# Patient Record
Sex: MALE | Race: OTHER | HISPANIC OR LATINO | Employment: FULL TIME | URBAN - METROPOLITAN AREA
[De-identification: names, ages, dates, MRNs, and addresses within clinical notes are randomized per-mention and may not be internally consistent; named-entity substitution may affect disease eponyms.]

---
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
## 2021-01-25 ENCOUNTER — OFFICE VISIT (OUTPATIENT)
Dept: FAMILY MEDICINE CLINIC | Facility: CLINIC | Age: 21
End: 2021-01-25
Payer: MEDICAID

## 2021-01-25 VITALS
DIASTOLIC BLOOD PRESSURE: 70 MMHG | HEART RATE: 65 BPM | OXYGEN SATURATION: 99 % | SYSTOLIC BLOOD PRESSURE: 106 MMHG | HEIGHT: 72 IN | RESPIRATION RATE: 14 BRPM | WEIGHT: 181.5 LBS | TEMPERATURE: 97.8 F | BODY MASS INDEX: 24.58 KG/M2

## 2021-01-25 DIAGNOSIS — J35.8 TONSIL STONE: Primary | ICD-10-CM

## 2021-01-25 PROCEDURE — 99213 OFFICE O/P EST LOW 20 MIN: CPT | Performed by: FAMILY MEDICINE

## 2021-01-25 PROCEDURE — 3008F BODY MASS INDEX DOCD: CPT | Performed by: FAMILY MEDICINE

## 2021-01-25 RX ORDER — FAMOTIDINE 10 MG
10 TABLET ORAL 2 TIMES DAILY
Qty: 14 TABLET | Refills: 0 | Status: SHIPPED | OUTPATIENT
Start: 2021-01-25 | End: 2022-04-29

## 2021-01-25 NOTE — PROGRESS NOTES
Assessment/Plan:      Diagnoses and all orders for this visit:    Tonsil stone  -     famotidine (PEPCID) 10 mg tablet; Take 1 tablet (10 mg total) by mouth 2 (two) times a day        Will follow up in 1 week for well visit to assess improvement in symptoms and for well physicial      Subjective:     Patient ID: Zuhair Shane is a 24 y o  male  Complaining of a one and a half week history of tonsil stones  First one was a week and a half ago, small (about 1 cm) and white in color  Over the past week, patient has been getting more and more of these stones  These episodes were preceded by clear rhinorrhea  Not painful  Denies trouble swallowing  Feels like there's a finger down his throat  This has never happened before  Denies fevers, chills, nausea, vomiting, diarrhea, SOB, or headaches  Review of Systems   All other systems reviewed and are negative  Objective:     Physical Exam  Constitutional:       Appearance: Normal appearance  HENT:      Mouth/Throat:      Mouth: Mucous membranes are moist       Pharynx: Oropharynx is clear  Posterior oropharyngeal erythema present  Eyes:      Conjunctiva/sclera: Conjunctivae normal       Pupils: Pupils are equal, round, and reactive to light  Neck:      Musculoskeletal: Normal range of motion and neck supple  No neck rigidity or muscular tenderness  Cardiovascular:      Rate and Rhythm: Normal rate and regular rhythm  Pulmonary:      Effort: Pulmonary effort is normal       Breath sounds: Normal breath sounds  Lymphadenopathy:      Cervical: No cervical adenopathy  Skin:     General: Skin is warm and dry  Neurological:      Mental Status: He is alert and oriented to person, place, and time  Psychiatric:         Mood and Affect: Mood normal          Thought Content:  Thought content normal

## 2021-03-18 ENCOUNTER — TELEMEDICINE (OUTPATIENT)
Dept: FAMILY MEDICINE CLINIC | Facility: CLINIC | Age: 21
End: 2021-03-18
Payer: MEDICAID

## 2021-03-18 DIAGNOSIS — F41.8 ANXIETY WITH DEPRESSION: Primary | ICD-10-CM

## 2021-03-18 PROCEDURE — 99213 OFFICE O/P EST LOW 20 MIN: CPT | Performed by: FAMILY MEDICINE
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
## 2021-03-20 ENCOUNTER — LAB (OUTPATIENT)
Dept: LAB | Facility: HOSPITAL | Age: 21
End: 2021-03-20
Payer: MEDICAID

## 2021-03-20 ENCOUNTER — TRANSCRIBE ORDERS (OUTPATIENT)
Dept: ADMINISTRATIVE | Facility: HOSPITAL | Age: 21
End: 2021-03-20

## 2021-03-20 DIAGNOSIS — F41.8 ANXIETY WITH DEPRESSION: ICD-10-CM

## 2021-03-20 LAB — TSH SERPL DL<=0.05 MIU/L-ACNC: 1.24 UIU/ML (ref 0.46–3.98)

## 2021-03-20 PROCEDURE — 36415 COLL VENOUS BLD VENIPUNCTURE: CPT

## 2021-03-20 PROCEDURE — 84443 ASSAY THYROID STIM HORMONE: CPT

## 2021-04-16 ENCOUNTER — OFFICE VISIT (OUTPATIENT)
Dept: URGENT CARE | Facility: CLINIC | Age: 21
End: 2021-04-16
Payer: MEDICAID

## 2021-04-16 VITALS
BODY MASS INDEX: 25.81 KG/M2 | HEIGHT: 72 IN | RESPIRATION RATE: 18 BRPM | DIASTOLIC BLOOD PRESSURE: 59 MMHG | TEMPERATURE: 98.1 F | OXYGEN SATURATION: 98 % | SYSTOLIC BLOOD PRESSURE: 118 MMHG | WEIGHT: 190.6 LBS | HEART RATE: 67 BPM

## 2021-04-16 DIAGNOSIS — H69.82 ACUTE DYSFUNCTION OF LEFT EUSTACHIAN TUBE: ICD-10-CM

## 2021-04-16 DIAGNOSIS — J06.9 ACUTE URI: Primary | ICD-10-CM

## 2021-04-16 PROCEDURE — 3008F BODY MASS INDEX DOCD: CPT | Performed by: FAMILY MEDICINE

## 2021-04-16 PROCEDURE — 99213 OFFICE O/P EST LOW 20 MIN: CPT | Performed by: FAMILY MEDICINE

## 2021-04-16 NOTE — LETTER
April 16, 2021     Patient: Peña Hoskins   YOB: 2000   Date of Visit: 4/16/2021       To Whom It May Concern:    Peña Hoskins was evaluated in my office on 04/16/2021  Please excuse his absence  Was tested for COVID-19 (4/12/2021) and found to be NEGATIVE  May return to work on 04/17/2021  If you have any questions or concerns, please don't hesitate to call           Sincerely,        Liz Ramos MD    CC: No Recipients

## 2021-04-16 NOTE — PROGRESS NOTES
[facility]        NAME: Vickie Pagan is a 22 y o  male  : 2000    MRN: [id number]  DATE: 2021  TIME: 11:46 AM    Assessment and Plan   Acute URI [J06 9]  1  Acute URI     2  Acute dysfunction of left eustachian tube  Ambulatory Referral to Otolaryngology     Bacterial sinusitis, strep pharyngitis and pneumonia unlikely per clinical evaluation  Likely secondary to postnasal drip from rhinitis  Also appears to have chronic dysfunction of the left eustachian tube  Referred to ENT for further evaluation management  Patient advised on supportive therapy including remaining well hydrated, gargling warm salt water using Flonase  May f/u with PCP in a few days if Sxs worsen  Patient Instructions     Follow up with PCP in 3-5 days  Proceed to  ER if symptoms worsen  Chief Complaint     Chief Complaint   Patient presents with    Shortness of Breath       nasal congestion, SOB, headache, sinus pressure  Had negative Covid test at [facility]         History of Present Illness       61-year-old male presents today with about 8 days of URI symptoms including nasal congestion, coughing and sinus pressure  At the onset he recalls experiencing a fever with chest tightness prompting him to be tested for COVID-19  PCR test performed at [facility] came back negative  Today's 1st day that his symptoms are much improved reporting that he can breathe through his nose  However heave describes having central chest pain with deep respirations  Denies any current fevers, coughing or dizziness  Denies any obvious sick contacts prior to the onset of symptoms  Review of Systems   Review of Systems   Constitutional: Positive for fever  HENT: Positive for congestion, rhinorrhea and sinus pressure  Respiratory: Positive for chest tightness  Negative for cough and shortness of breath  Cardiovascular: Positive for chest pain  Gastrointestinal: Positive for nausea  Negative for abdominal pain  Musculoskeletal: Negative for myalgias  Allergic/Immunologic: Negative for environmental allergies  Neurological: Positive for dizziness and headaches  Current Medications       Current Outpatient Medications:     famotidine (PEPCID) 10 mg tablet, Take 1 tablet (10 mg total) by mouth 2 (two) times a day (Patient not taking: Reported on 4/16/2021), Disp: 14 tablet, Rfl: 0    Current Allergies     Allergies as of 04/16/2021    (No Known Allergies)            The following portions of the patient's history were reviewed and updated as appropriate: allergies, current medications, past family history, past medical history, past social history, past surgical history and problem list      Past Medical History:   Diagnosis Date    Asthma     childhood    Constipation        Past Surgical History:   Procedure Laterality Date    FRACTURE SURGERY Left     tibia    ORIF TIBIA FRACTURE Left 3/23/2017    Procedure: OPEN REDUCTION W/ INTERNAL FIXATION (ORIF) DISTAL TIBIA  ARTICULAR  FRACTURE;  Surgeon: Erick Garcia MD;  Location: [facility] MAIN OR;  Service:        Family History   Problem Relation Age of Onset    No Known Problems Mother     No Known Problems Father     No Known Problems Sister          Medications have been verified  Objective   /59   Pulse 67   Temp 98 1 °F (36 7 °C)   Resp 18   Ht 6' (1 829 m)   Wt 86 5 kg (190 lb 9 6 oz)   SpO2 98%   BMI 25 85 kg/m²   No LMP for male patient  Physical Exam     Physical Exam  Vitals signs and nursing note reviewed  Constitutional:       General: He is not in acute distress  Appearance: He is well-developed and normal weight  He is not ill-appearing, toxic-appearing or diaphoretic  HENT:      Head: Normocephalic and atraumatic  Comments: TMs gray bilaterally  Mouth/Throat:      Mouth: Mucous membranes are moist       Pharynx: No oropharyngeal exudate     Cardiovascular:      Rate and Rhythm: Normal rate and regular rhythm  Pulmonary:      Effort: Pulmonary effort is normal  No respiratory distress  Breath sounds: Normal breath sounds  No decreased breath sounds, wheezing, rhonchi or rales  Chest:      Chest wall: Tenderness (Minimal sternal tenderness) present  Skin:     General: Skin is warm  Findings: No erythema  Neurological:      General: No focal deficit present  Mental Status: He is alert and oriented to person, place, and time  Psychiatric:         Mood and Affect: Mood normal  Mood is not anxious  Behavior: Behavior normal  Behavior is not agitated

## 2021-05-31 ENCOUNTER — APPOINTMENT (EMERGENCY)
Dept: RADIOLOGY | Facility: HOSPITAL | Age: 21
End: 2021-05-31
Payer: MEDICAID

## 2021-05-31 ENCOUNTER — HOSPITAL ENCOUNTER (EMERGENCY)
Facility: HOSPITAL | Age: 21
Discharge: HOME | End: 2021-05-31
Attending: EMERGENCY MEDICINE
Payer: MEDICAID

## 2021-05-31 VITALS
TEMPERATURE: 98 F | BODY MASS INDEX: 24.41 KG/M2 | RESPIRATION RATE: 20 BRPM | SYSTOLIC BLOOD PRESSURE: 125 MMHG | DIASTOLIC BLOOD PRESSURE: 87 MMHG | HEART RATE: 76 BPM | WEIGHT: 180 LBS | OXYGEN SATURATION: 99 %

## 2021-05-31 DIAGNOSIS — R05.9 COUGH: Primary | ICD-10-CM

## 2021-05-31 PROCEDURE — 94640 AIRWAY INHALATION TREATMENT: CPT

## 2021-05-31 PROCEDURE — 99284 EMERGENCY DEPT VISIT MOD MDM: CPT

## 2021-05-31 PROCEDURE — 99284 EMERGENCY DEPT VISIT MOD MDM: CPT | Performed by: EMERGENCY MEDICINE

## 2021-05-31 PROCEDURE — 71045 X-RAY EXAM CHEST 1 VIEW: CPT

## 2021-05-31 RX ORDER — ALBUTEROL SULFATE 90 UG/1
2 AEROSOL, METERED RESPIRATORY (INHALATION) EVERY 4 HOURS PRN
Qty: 8 G | Refills: 0 | Status: SHIPPED | OUTPATIENT
Start: 2021-05-31

## 2021-05-31 RX ORDER — ALBUTEROL SULFATE 2.5 MG/3ML
2.5 SOLUTION RESPIRATORY (INHALATION) ONCE
Status: COMPLETED | OUTPATIENT
Start: 2021-05-31 | End: 2021-05-31

## 2021-05-31 RX ADMIN — ALBUTEROL SULFATE 2.5 MG: 2.5 SOLUTION RESPIRATORY (INHALATION) at 19:29

## 2021-05-31 NOTE — ED PROVIDER NOTES
History  Chief Complaint   Patient presents with    Shortness of Breath     on saturday bought a CBD vape and has been SOB and diaphoretic since     Patient states he bought a new CBD products which he had never used before and vaped it 2 days prior to arrival   Patient states he knew something was wrong immediately  He had been coughing including coughing up some particular matter since  There has been no hemoptysis  No fever  Patient feels short of breath while at rest with SaO2 of 100% on room air          Prior to Admission Medications   Prescriptions Last Dose Informant Patient Reported? Taking?   famotidine (PEPCID) 10 mg tablet Not Taking at Unknown time  No No   Sig: Take 1 tablet (10 mg total) by mouth 2 (two) times a day   Patient not taking: Reported on 4/16/2021      Facility-Administered Medications: None       Past Medical History:   Diagnosis Date    Asthma     childhood    Constipation        Past Surgical History:   Procedure Laterality Date    FRACTURE SURGERY Left     tibia    ORIF TIBIA FRACTURE Left 3/23/2017    Procedure: OPEN REDUCTION W/ INTERNAL FIXATION (ORIF) DISTAL TIBIA  ARTICULAR  FRACTURE;  Surgeon: Lily Mendez MD;  Location: [place] [address] MAIN OR;  Service:        Family History   Problem Relation Age of Onset    No Known Problems Mother     No Known Problems Father     No Known Problems Sister      I have reviewed and agree with the history as documented  E-Cigarette/Vaping    E-Cigarette Use Never User      E-Cigarette/Vaping Substances    Nicotine No     THC No     CBD No     Flavoring No     Other No     Unknown No      Social History     Tobacco Use    Smoking status: Former Smoker     Types: E-Cigarettes    Smokeless tobacco: Never Used    Tobacco comment: vape - 20 x day   Substance Use Topics    Alcohol use: No    Drug use: Yes     Comment: CBD vape       Review of Systems   Constitutional: Negative for chills and fever     HENT: Negative for congestion, sinus pressure, sneezing and sore throat  Eyes: Negative for visual disturbance  Respiratory: Positive for cough and shortness of breath  Negative for wheezing  Cardiovascular: Positive for chest pain  Negative for palpitations and leg swelling  Gastrointestinal: Negative for abdominal pain and vomiting  Genitourinary: Negative for dysuria  Musculoskeletal: Negative for back pain  Skin: Negative for rash  Neurological: Negative for weakness and headaches  Hematological: Does not bruise/bleed easily  Psychiatric/Behavioral: Negative for confusion  The patient is nervous/anxious  All other systems reviewed and are negative  Physical Exam  Physical Exam  Vitals signs and nursing note reviewed  Constitutional:       Appearance: He is well-developed  HENT:      Head: Normocephalic and atraumatic  Mouth/Throat:      Mouth: Mucous membranes are moist    Eyes:      Extraocular Movements: Extraocular movements intact  Pupils: Pupils are equal, round, and reactive to light  Neck:      Musculoskeletal: Normal range of motion and neck supple  Cardiovascular:      Rate and Rhythm: Normal rate and regular rhythm  Pulmonary:      Effort: Pulmonary effort is normal  No respiratory distress  Breath sounds: Normal breath sounds  No stridor  No wheezing or rhonchi  Chest:      Chest wall: No tenderness  Abdominal:      Tenderness: There is no abdominal tenderness  Musculoskeletal: Normal range of motion  Right lower leg: He exhibits no tenderness  Left lower leg: He exhibits no tenderness  Skin:     General: Skin is warm and dry  Neurological:      General: No focal deficit present  Mental Status: He is alert and oriented to person, place, and time  Psychiatric:         Mood and Affect: Mood is anxious           Behavior: Behavior normal          Vital Signs  ED Triage Vitals [05/31/21 1746]   Temperature Pulse Respirations Blood Pressure SpO2 98 °F (36 7 °C) 76 20 125/87 99 %      Temp src Heart Rate Source Patient Position - Orthostatic VS BP Location FiO2 (%)   -- -- -- -- --      Pain Score       5           Vitals:    05/31/21 1746   BP: 125/87   Pulse: 76         Visual Acuity      ED Medications  Medications   albuterol inhalation solution 2 5 mg (2 5 mg Nebulization Given 5/31/21 1929)       Diagnostic Studies  Results Reviewed     None                 XR chest 1 view portable   Final Result by Reid Torres MD (06/01 9402)      No acute cardiopulmonary disease  Workstation performed: [id number]                    Procedures  Procedures         ED Course                                           MDM  Number of Diagnoses or Management Options  Cough:   Diagnosis management comments: Patient has been vaping different substances for quite some time  Will check chest x-ray  Given the patient albuterol to use as in expectorant  Cautioned him against continued vaping and inhalation injuries      Disposition  Final diagnoses:   Cough     Time reflects when diagnosis was documented in both MDM as applicable and the Disposition within this note     Time User Action Codes Description Comment    5/31/2021  7:27 PM Osorio Sommers Add [R05] Cough       ED Disposition     ED Disposition Condition Date/Time Comment    Discharge Stable Mon May 31, 2021  7:26 PM Abelardo Waite discharge to home/self care              Follow-up Information     Follow up With Specialties Details Why [address], MD Pediatrics   [place] [address]  [phone]            Discharge Medication List as of 5/31/2021  7:28 PM      START taking these medications    Details   albuterol (PROVENTIL HFA,VENTOLIN HFA) 90 mcg/act inhaler Inhale 2 puffs every 4 (four) hours as needed for wheezing, Starting Mon 5/31/2021, Normal         CONTINUE these medications which have NOT CHANGED    Details   famotidine (PEPCID) 10 mg tablet Take 1 tablet (10 mg total) by mouth 2 (two) times a day, Starting Mon 1/25/2021, Normal           No discharge procedures on file      PDMP Review     None          ED Provider  Electronically Signed by           Britt Morris MD  06/01/21 9785

## 2021-08-03 ENCOUNTER — OFFICE VISIT (OUTPATIENT)
Dept: URGENT CARE | Facility: CLINIC | Age: 21
End: 2021-08-03
Payer: MEDICAID

## 2021-08-03 VITALS
BODY MASS INDEX: 24.41 KG/M2 | SYSTOLIC BLOOD PRESSURE: 133 MMHG | HEART RATE: 72 BPM | OXYGEN SATURATION: 97 % | WEIGHT: 180 LBS | DIASTOLIC BLOOD PRESSURE: 81 MMHG | TEMPERATURE: 97.9 F | RESPIRATION RATE: 18 BRPM

## 2021-08-03 DIAGNOSIS — K52.9 GASTROENTERITIS, INFECTIOUS, PRESUMED: Primary | ICD-10-CM

## 2021-08-03 PROCEDURE — 99213 OFFICE O/P EST LOW 20 MIN: CPT | Performed by: PHYSICIAN ASSISTANT

## 2021-08-03 NOTE — LETTER
August 3, 2021     Patient: Imelda Cason   YOB: 2000   Date of Visit: 8/3/2021       To Whom it May Concern:    Imelda Cason was seen in my clinic on 8/3/2021  Please excuse from work on 8/3/2021  If you have any questions or concerns, please don't hesitate to call           Sincerely,          Rajani Gonzalez PA-C        CC: No Recipients

## 2021-08-03 NOTE — PATIENT INSTRUCTIONS
Presumed infectious gastroenteritis  Bowel rest  Fluids  When trying solids, small portions of bland foods  Discussed BRAT diet  Follow up with PCP in 3-5 days  Proceed to  ER if symptoms worsen  Gastroenteritis   WHAT YOU NEED TO KNOW:   Gastroenteritis, or stomach flu, is an infection of the stomach and intestines  DISCHARGE INSTRUCTIONS:   Call 911 for any of the following:   · You have trouble breathing or a very fast pulse  Return to the emergency department if:   · You see blood in your diarrhea  · You cannot stop vomiting  · You have not urinated for 12 hours  · You feel like you are going to faint  Contact your healthcare provider if:   · You have a fever  · You continue to vomit or have diarrhea, even after treatment  · You see worms in your diarrhea  · Your mouth or eyes are dry  You are not urinating as much or as often  · You have questions or concerns about your condition or care  Medicines:   · Medicines  may be given to stop vomiting or diarrhea, decrease abdominal cramps, or treat an infection  · Take your medicine as directed  Contact your healthcare provider if you think your medicine is not helping or if you have side effects  Tell him or her if you are allergic to any medicine  Keep a list of the medicines, vitamins, and herbs you take  Include the amounts, and when and why you take them  Bring the list or the pill bottles to follow-up visits  Carry your medicine list with you in case of an emergency  Manage your symptoms:   · Drink liquids as directed  Ask your healthcare provider how much liquid to drink each day, and which liquids are best for you  You may also need to drink an oral rehydration solution (ORS)  An ORS has the right amounts of sugar, salt, and minerals in water to replace body fluids  · Eat bland foods  When you feel hungry, begin eating soft, bland foods  Examples are bananas, clear soup, potatoes, and applesauce   Do not have dairy products, alcohol, sugary drinks, or drinks with caffeine until you feel better  · Rest as much as possible  Slowly start to do more each day when you begin to feel better  Prevent the spread of gastroenteritis:  Gastroenteritis can spread easily  Keep yourself, your family, and your surroundings clean to help prevent the spread of gastroenteritis:  · Wash your hands often  Use soap and water  Wash your hands after you use the bathroom, change a child's diapers, or sneeze  Wash your hands before you prepare or eat food  · Clean surfaces and do laundry often  Wash your clothes and towels separately from the rest of the laundry  Clean surfaces in your home with antibacterial  or bleach  · Clean food thoroughly and cook safely  Wash raw vegetables before you cook  Cook meat, fish, and eggs fully  Do not use the same dishes for raw meat as you do for other foods  Refrigerate any leftover food immediately  · Be aware when you camp or travel  Drink only clean water  Do not drink from rivers or lakes unless you purify or boil the water first  When you travel, drink bottled water and do not add ice  Do not eat fruit that has not been peeled  Do not eat raw fish or meat that is not fully cooked  Follow up with your healthcare provider as directed:  Write down your questions so you remember to ask them during your visits  © Copyright [employer] 2021 Information is for End User's use only and may not be sold, redistributed or otherwise used for commercial purposes  All illustrations and images included in CareNotes® are the copyrighted property of A D A M , Inc  or [facility] Pastr[facility]   The above information is an  only  It is not intended as medical advice for individual conditions or treatments  Talk to your doctor, nurse or pharmacist before following any medical regimen to see if it is safe and effective for you

## 2021-08-03 NOTE — PROGRESS NOTES
[address] [facility]    NAME: Alicja Walton is a 22 y o  male  : 2000    MRN: [id number]  DATE: 2021  TIME: 10:59 AM    Assessment and Plan   Gastroenteritis, infectious, presumed [K52 9]  1  Gastroenteritis, infectious, presumed       Patient Instructions   Presumed infectious gastroenteritis  Bowel rest  Fluids  When trying solids, small portions of bland foods  Discussed BRAT diet  Follow up with PCP in 3-5 days  Proceed to  ER if symptoms worsen  Chief Complaint     Chief Complaint   Patient presents with    Vomiting     went to the fair on saturday and after eating had severe abdominal cramping  later he vomited  diarrhea and vomitng continue  r         History of Present Illness        Florinda Galloway is a 64-year-old male who presents to clinic complaining of vomiting and diarrhea since last night  He states that he went to the [facility] last night and 1-1/2 hour after eating started with vomiting  He states this morning started with diarrhea  He is also complaining of fatigue, heartburn, and headache  He has only been tolerating fluids no solids  He denies any fever chills  He denies any blood or mucus in the diarrhea vomit  He denies any abdominal pain  Review of Systems   Review of Systems   Constitutional: Positive for fatigue  Negative for chills and fever  Gastrointestinal: Positive for diarrhea and vomiting  Negative for abdominal pain and blood in stool  Neurological: Positive for headaches           Current Medications       Current Outpatient Medications:     albuterol (PROVENTIL HFA,VENTOLIN HFA) 90 mcg/act inhaler, Inhale 2 puffs every 4 (four) hours as needed for wheezing (Patient not taking: Reported on 2021), Disp: 8 g, Rfl: 0    famotidine (PEPCID) 10 mg tablet, Take 1 tablet (10 mg total) by mouth 2 (two) times a day (Patient not taking: Reported on 2021), Disp: 14 tablet, Rfl: 0    Current Allergies     Allergies as of 2021    (No Known Allergies) The following portions of the patient's history were reviewed and updated as appropriate: allergies, current medications, past family history, past medical history, past social history, past surgical history and problem list      Past Medical History:   Diagnosis Date    Asthma     childhood    Constipation        Past Surgical History:   Procedure Laterality Date    FRACTURE SURGERY Left     tibia    ORIF TIBIA FRACTURE Left 3/23/2017    Procedure: OPEN REDUCTION W/ INTERNAL FIXATION (ORIF) DISTAL TIBIA  ARTICULAR  FRACTURE;  Surgeon: Aileen Cano MD;  Location: [place] [address] MAIN OR;  Service:        Family History   Problem Relation Age of Onset    No Known Problems Mother     No Known Problems Father     No Known Problems Sister          Medications have been verified  Objective   /81   Pulse 72   Temp 97 9 °F (36 6 °C)   Resp 18   Wt 81 6 kg (180 lb)   SpO2 97%   BMI 24 41 kg/m²   No LMP for male patient  Physical Exam     Physical Exam  Vitals and nursing note reviewed  Constitutional:       General: He is not in acute distress  Appearance: Normal appearance  He is not ill-appearing  Cardiovascular:      Rate and Rhythm: Normal rate and regular rhythm  Heart sounds: Normal heart sounds  Pulmonary:      Effort: Pulmonary effort is normal       Breath sounds: Normal breath sounds  Abdominal:      General: Abdomen is flat  Bowel sounds are increased  Palpations: Abdomen is soft  Tenderness: There is no abdominal tenderness  There is no guarding or rebound  Neurological:      Mental Status: He is alert and oriented to person, place, and time     Psychiatric:         Mood and Affect: Mood normal          Behavior: Behavior normal

## 2021-08-04 ENCOUNTER — OFFICE VISIT (OUTPATIENT)
Dept: FAMILY MEDICINE CLINIC | Facility: CLINIC | Age: 21
End: 2021-08-04
Payer: MEDICAID

## 2021-08-04 VITALS
DIASTOLIC BLOOD PRESSURE: 80 MMHG | HEART RATE: 115 BPM | RESPIRATION RATE: 16 BRPM | BODY MASS INDEX: 24.52 KG/M2 | HEIGHT: 72 IN | OXYGEN SATURATION: 98 % | SYSTOLIC BLOOD PRESSURE: 102 MMHG | TEMPERATURE: 97.8 F | WEIGHT: 181 LBS

## 2021-08-04 DIAGNOSIS — K52.9 GASTROENTERITIS: Primary | ICD-10-CM

## 2021-08-04 PROCEDURE — 99212 OFFICE O/P EST SF 10 MIN: CPT | Performed by: FAMILY MEDICINE

## 2021-08-04 NOTE — LETTER
August 4, 2021     Patient: Eber Rogers   YOB: 2000   Date of Visit: 8/4/2021       To Whom it May Concern:    Eber Rogers is under my professional care  He was seen in my office on 8/4/2021  He may return to work on 8/5/2021  If you have any questions or concerns, please don't hesitate to call           Sincerely,          Dinesh Manzo MD        CC: No Recipients

## 2021-08-04 NOTE — PROGRESS NOTES
Assessment/Plan:      Diagnoses and all orders for this visit:    Gastroenteritis        Maki Leon should continue supportive care for gastroenteritis  Overall he is much improved from symptom onset and symptoms should continue to resolve over the next 2-3 days  RTO   In 4-6 weeks for annual physical     Subjective:     Patient ID: Harvinder Bazzi is a 22 y o  male   With a past medical history of intermittent asthma with infrequent albuterol use presenting today with abdominal pain, nausea, vomiting  This started  3 days ago when he [place]  He states that he final caking approximately 2-3 hours later both him and his girlfriend who also ate the food began to feel abdominal cramps for approximately 6 hours a began to have nonbloody nonbilious emesis and  Diarrhea  This occurred for about 24-48 hours  He also felt subjectively feverish  Today he is able to eat and drink close to baseline  He still having difficulty with some foods  Diarrhea and emesis overall have abated  Review of Systems   Constitutional: Positive for appetite change  Negative for chills, fatigue and fever  Gastrointestinal: Negative for abdominal distention, abdominal pain, blood in stool, constipation, diarrhea and nausea  Musculoskeletal: Negative for arthralgias and myalgias  Skin: Negative for pallor  Objective:     Physical Exam  Vitals reviewed  HENT:      Head: Normocephalic  Cardiovascular:      Rate and Rhythm: Normal rate  Abdominal:      General: Abdomen is flat  Bowel sounds are normal       Palpations: Abdomen is soft  Skin:     General: Skin is warm and dry  Neurological:      Mental Status: He is alert

## 2021-08-11 ENCOUNTER — HOSPITAL ENCOUNTER (EMERGENCY)
Facility: HOSPITAL | Age: 21
Discharge: HOME | End: 2021-08-11
Attending: EMERGENCY MEDICINE
Payer: MEDICAID

## 2021-08-11 VITALS
SYSTOLIC BLOOD PRESSURE: 115 MMHG | HEART RATE: 90 BPM | RESPIRATION RATE: 18 BRPM | OXYGEN SATURATION: 97 % | DIASTOLIC BLOOD PRESSURE: 83 MMHG | TEMPERATURE: 98.4 F

## 2021-08-11 DIAGNOSIS — H66.90 OTITIS MEDIA: Primary | ICD-10-CM

## 2021-08-11 LAB
S PYO DNA THROAT QL NAA+PROBE: NORMAL
SARS-COV-2 RNA RESP QL NAA+PROBE: NEGATIVE

## 2021-08-11 PROCEDURE — U0005 INFEC AGEN DETEC AMPLI PROBE: HCPCS | Performed by: EMERGENCY MEDICINE

## 2021-08-11 PROCEDURE — 99283 EMERGENCY DEPT VISIT LOW MDM: CPT

## 2021-08-11 PROCEDURE — 87651 STREP A DNA AMP PROBE: CPT | Performed by: EMERGENCY MEDICINE

## 2021-08-11 PROCEDURE — U0003 INFECTIOUS AGENT DETECTION BY NUCLEIC ACID (DNA OR RNA); SEVERE ACUTE RESPIRATORY SYNDROME CORONAVIRUS 2 (SARS-COV-2) (CORONAVIRUS DISEASE [COVID-19]), AMPLIFIED PROBE TECHNIQUE, MAKING USE OF HIGH THROUGHPUT TECHNOLOGIES AS DESCRIBED BY CMS-2020-01-R: HCPCS | Performed by: EMERGENCY MEDICINE

## 2021-08-11 PROCEDURE — 99284 EMERGENCY DEPT VISIT MOD MDM: CPT | Performed by: EMERGENCY MEDICINE

## 2021-08-11 RX ORDER — AMOXICILLIN AND CLAVULANATE POTASSIUM 875; 125 MG/1; MG/1
1 TABLET, FILM COATED ORAL EVERY 12 HOURS
Qty: 20 TABLET | Refills: 0 | Status: SHIPPED | OUTPATIENT
Start: 2021-08-11 | End: 2021-08-21

## 2021-08-11 NOTE — ED PROVIDER NOTES
History  Chief Complaint   Patient presents with    Fever - 9 weeks to 74 years     here last week with GI problem  started with scratchy throat several days ago  progressed to uri and fever now has cough     22year-old male presents with scratchy throat left ear pain and cough for the past few days  He has not been vaccinated against COVID  No other symptoms of nausea vomiting abdominal pain diarrhea constipation or any other symptoms  History provided by:  Patient   used: No        Prior to Admission Medications   Prescriptions Last Dose Informant Patient Reported? Taking? albuterol (PROVENTIL HFA,VENTOLIN HFA) 90 mcg/act inhaler   No No   Sig: Inhale 2 puffs every 4 (four) hours as needed for wheezing   Patient not taking: Reported on 8/4/2021   famotidine (PEPCID) 10 mg tablet   No No   Sig: Take 1 tablet (10 mg total) by mouth 2 (two) times a day   Patient not taking: Reported on 4/16/2021      Facility-Administered Medications: None       Past Medical History:   Diagnosis Date    Asthma     childhood    Constipation        Past Surgical History:   Procedure Laterality Date    FRACTURE SURGERY Left     tibia    ORIF TIBIA FRACTURE Left 3/23/2017    Procedure: OPEN REDUCTION W/ INTERNAL FIXATION (ORIF) DISTAL TIBIA  ARTICULAR  FRACTURE;  Surgeon: Bailey New MD;  Location: [facility] MAIN OR;  Service:        Family History   Problem Relation Age of Onset    No Known Problems Mother     No Known Problems Father     No Known Problems Sister      I have reviewed and agree with the history as documented      E-Cigarette/Vaping    E-Cigarette Use Never User      E-Cigarette/Vaping Substances    Nicotine No     THC No     CBD No     Flavoring No     Other No     Unknown No      Social History     Tobacco Use    Smoking status: Former Smoker     Types: E-Cigarettes    Smokeless tobacco: Never Used    Tobacco comment: vape - 20 x day   Vaping Use    Vaping Use: Never used   Substance Use Topics    Alcohol use: No    Drug use: Yes     Comment: CBD vape       Review of Systems   Constitutional: Negative  HENT: Positive for congestion, ear pain and sore throat  Eyes: Negative  Respiratory: Negative  Cardiovascular: Negative  Gastrointestinal: Negative  Endocrine: Negative  Genitourinary: Negative  Musculoskeletal: Negative  Skin: Negative  Allergic/Immunologic: Negative  Neurological: Negative  Hematological: Negative  Psychiatric/Behavioral: Negative  All other systems reviewed and are negative  Physical Exam  Physical Exam  Constitutional:       Appearance: Normal appearance  HENT:      Head: Normocephalic and atraumatic  Comments: Left ear:  Erythema noted in the ear canal with bulging of the tympanic membrane  No evidence of mastoiditis  Right ear:  Normal exam      Nose: Congestion present  Mouth/Throat:      Mouth: Mucous membranes are moist       Pharynx: Posterior oropharyngeal erythema present  Eyes:      Extraocular Movements: Extraocular movements intact  Pupils: Pupils are equal, round, and reactive to light  Cardiovascular:      Rate and Rhythm: Normal rate and regular rhythm  Pulmonary:      Effort: Pulmonary effort is normal       Breath sounds: Normal breath sounds  Abdominal:      General: Abdomen is flat  Bowel sounds are normal       Palpations: Abdomen is soft  Musculoskeletal:         General: Normal range of motion  Cervical back: Normal range of motion and neck supple  Skin:     General: Skin is warm  Capillary Refill: Capillary refill takes less than 2 seconds  Neurological:      General: No focal deficit present  Mental Status: He is alert and oriented to person, place, and time  Mental status is at baseline  Psychiatric:         Mood and Affect: Mood normal          Thought Content:  Thought content normal          Vital Signs  ED Triage Vitals [08/11/21 1448]   Temperature Pulse Respirations Blood Pressure SpO2   98 4 °F (36 9 °C) 96 20 115/83 97 %      Temp Source Heart Rate Source Patient Position - Orthostatic VS BP Location FiO2 (%)   Tympanic Monitor Sitting Right arm --      Pain Score       7           Vitals:    08/11/21 1448   BP: 115/83   Pulse: 96   Patient Position - Orthostatic VS: Sitting         Visual Acuity      ED Medications  Medications - No data to display    Diagnostic Studies  Results Reviewed     Procedure Component Value Units Date/Time    Strep A PCR [[id number]]  (Normal) Collected: 08/11/21 1551    Lab Status: Final result Specimen: Throat Updated: 08/11/21 1642     STREP A PCR None Detected    Novel Coronavirus [facility]-),PCR [facility] [[id number]] Collected: 08/11/21 1551    Lab Status: In process Specimen: Nares from Nose Updated: 08/11/21 1602                 No orders to display              Procedures  Procedures         ED Course                                           MDM  Number of Diagnoses or Management Options     Amount and/or Complexity of Data Reviewed  Clinical lab tests: ordered and reviewed  Tests in the medicine section of CPT®: ordered and reviewed    Patient Progress  Patient progress: stable      Disposition  Final diagnoses:   Otitis media     Time reflects when diagnosis was documented in both MDM as applicable and the Disposition within this note     Time User Action Codes Description Comment    8/11/2021  4:50 PM Erwin Estrada Add [H66 90] Otitis media       ED Disposition     ED Disposition Condition Date/Time Comment    Discharge Stable Wed Aug 11, 2021  4:50 PM oRwena Navarro discharge to home/self care              Follow-up Information     Follow up With Specialties Details Why Contact Info Additional Information    [facility] Emergency Department Emergency Medicine  If symptoms worsen [address] [address]  [address] [place] [address] Emergency Department, [address] [place] [address], [address]          Patient's Medications   Discharge Prescriptions    AMOXICILLIN-CLAVULANATE (AUGMENTIN) 875-125 MG PER TABLET    Take 1 tablet by mouth every 12 (twelve) hours for 10 days       Start Date: 8/11/2021 End Date: 8/21/2021       Order Dose: 1 tablet       Quantity: 20 tablet    Refills: 0     No discharge procedures on file      PDMP Review     None          ED Provider  Electronically Signed by           Janet Marshall DO  08/11/21 5756

## 2021-08-11 NOTE — Clinical Note
Shellie Delarosa was seen and treated in our emergency department on 8/11/2021  Diagnosis:     [place]    He may return on this date:     Pt was seen in The ER on 8/11/21  If you have any questions or concerns, please don't hesitate to call        Nessa Gastelum RN    ______________________________           _______________          _______________  Hospital Representative                              Date                                Time

## 2021-08-26 ENCOUNTER — OFFICE VISIT (OUTPATIENT)
Dept: FAMILY MEDICINE CLINIC | Facility: CLINIC | Age: 21
End: 2021-08-26
Payer: MEDICAID

## 2021-08-26 VITALS
HEART RATE: 72 BPM | HEIGHT: 70 IN | OXYGEN SATURATION: 98 % | SYSTOLIC BLOOD PRESSURE: 116 MMHG | BODY MASS INDEX: 26.05 KG/M2 | DIASTOLIC BLOOD PRESSURE: 76 MMHG | RESPIRATION RATE: 18 BRPM | WEIGHT: 182 LBS | TEMPERATURE: 97.7 F

## 2021-08-26 DIAGNOSIS — Z87.81 HX OF FRACTURE OF TIBIA: ICD-10-CM

## 2021-08-26 DIAGNOSIS — M25.572 ACUTE LEFT ANKLE PAIN: Primary | ICD-10-CM

## 2021-08-26 PROCEDURE — 99213 OFFICE O/P EST LOW 20 MIN: CPT | Performed by: FAMILY MEDICINE

## 2021-08-26 NOTE — LETTER
August 26, 2021     Patient: Fredy Daly   YOB: 2000   Date of Visit: 8/26/2021       To Whom it May Concern:    Fredy Daly is under my professional care  He was seen in my office on 8/26/2021  He may return to work with limitations 8/30/21  Limit weight bearing on left ankle until patient is evaluated by specialist     If you have any questions or concerns, please don't hesitate to call           Sincerely,          Froilan Mcclendon MD

## 2021-08-26 NOTE — PROGRESS NOTES
Assessment/Plan:      Diagnoses and all orders for this visit:    Acute left ankle pain  -     Ambulatory referral to Orthopedic Surgery; Future    Hx of fracture of tibia  -     Cancel: Ambulatory referral to Orthopedic Surgery; Future  -     Ambulatory referral to Orthopedic Surgery; Future          Patient is weight-bearing in office  History of a closed fracture of distal end of left tibia in March of 2017  Repaired by Dr Jocelynn Mcclendon at Orthopedic surgery  Referral placed to follow-up with orthopedic surgery given history  Follow-up in 1 month for yearly physical     Subjective:     Patient ID: Jodean Cheadle is a 22 y o  male  Ankle Pain   The incident occurred 12 to 24 hours ago  The incident occurred at work  The pain is present in the left ankle  The pain is at a severity of 9/10 (w/ walking)  The pain has been constant since onset  Associated symptoms include numbness (medial aspect of left ankle)  Pertinent negatives include no inability to bear weight, loss of sensation or tingling  The symptoms are aggravated by movement and weight bearing  He has tried ice for the symptoms  The treatment provided no relief  Hx of closed extra-articular fracture of distal end of left tibia in March 2017  Review of Systems   Constitutional: Negative for chills and fever  HENT: Negative for sore throat  Respiratory: Negative for shortness of breath  Cardiovascular: Negative for chest pain  Gastrointestinal: Negative for abdominal pain, constipation, diarrhea, nausea and vomiting  Genitourinary: Negative for difficulty urinating and dysuria  Neurological: Positive for numbness (medial aspect of left ankle)  Negative for tingling  Objective:     Physical Exam  Constitutional:       Appearance: Normal appearance  Cardiovascular:      Rate and Rhythm: Normal rate and regular rhythm  Heart sounds: Normal heart sounds  No murmur heard       Pulmonary:      Effort: Pulmonary effort is normal       Breath sounds: Normal breath sounds  No wheezing  Abdominal:      Palpations: Abdomen is soft  Tenderness: There is no abdominal tenderness  Musculoskeletal:      Right lower leg: No edema  Left lower leg: No edema (mild)  Right ankle: Normal  No swelling, deformity or ecchymosis  Normal range of motion  Left ankle: Swelling (mild) present  No deformity or ecchymosis  Tenderness present over the lateral malleolus  Medial malleolus: numbness  Decreased range of motion  Neurological:      Mental Status: He is alert

## 2021-08-27 ENCOUNTER — OFFICE VISIT (OUTPATIENT)
Dept: OBGYN CLINIC | Facility: CLINIC | Age: 21
End: 2021-08-27
Payer: MEDICAID

## 2021-08-27 ENCOUNTER — APPOINTMENT (OUTPATIENT)
Dept: RADIOLOGY | Facility: CLINIC | Age: 21
End: 2021-08-27
Payer: MEDICAID

## 2021-08-27 VITALS
BODY MASS INDEX: 26.37 KG/M2 | SYSTOLIC BLOOD PRESSURE: 122 MMHG | HEIGHT: 70 IN | HEART RATE: 78 BPM | DIASTOLIC BLOOD PRESSURE: 69 MMHG | WEIGHT: 184.2 LBS

## 2021-08-27 DIAGNOSIS — Z87.81 HX OF FRACTURE OF TIBIA: ICD-10-CM

## 2021-08-27 DIAGNOSIS — M19.172 POST-TRAUMATIC ARTHRITIS OF LEFT ANKLE: Primary | ICD-10-CM

## 2021-08-27 DIAGNOSIS — M25.572 ACUTE LEFT ANKLE PAIN: ICD-10-CM

## 2021-08-27 PROCEDURE — 73610 X-RAY EXAM OF ANKLE: CPT

## 2021-08-27 PROCEDURE — 99203 OFFICE O/P NEW LOW 30 MIN: CPT | Performed by: ORTHOPAEDIC SURGERY

## 2021-08-27 NOTE — PROGRESS NOTES
Assessment/Plan:  1  Post-traumatic arthritis of left ankle  Ambulatory referral to Physical Therapy   2  Hx of fracture of tibia  Ambulatory referral to Orthopedic Surgery   3  Acute left ankle pain  Ambulatory referral to Orthopedic Surgery    XR ankle 3+ vw left       Scribe Attestation    I,:  Carmen Guillen MA am acting as a scribe while in the presence of the attending physician :       I,:  Ernestina Rose MD personally performed the services described in this documentation    as scribed in my presence :             I discussed with Ronaldo Zuhair that his signs and symptoms are consistent with post-traumatic left ankle arthritis  X-rays were reviewed which demonstrates mild degenerative changes with anterior osteophyte formation  Patient does have stiffness and weakness on exam  I would like to treat non operative in the form of formal physical therapy  He was agreeable to this  A referral was provided to formal therapy for range of motion and strengthening  I discussed with him he may need surgery in the future for the arthritis  He will follow up in 6 weeks for repeat evaluation  Subjective:   Jodean Cheadle is a 22 y o  male who presents to the office today for evaluation of his left ankle  He is referred by his primary care physician  I did perform ORIF left ankle for a tibia fracture on 3/23/17  Patient states he has always had mild intermittent pain since surgery however, he states this has been progressively getting worse over the past 2 months  Patient states this has increased the past 2 days  Patient notes pain to the lateral aspect of his ankle  He notes increased pain with ambulation  Patient also notes cracking with motion  He does note stiffness about the ankle  Patient states he did change his gait due to the pain  He has not been taking anything OTC for pain  Patient did have bruising 2 days ago  He denies any known injury or trauma         Review of Systems   Constitutional: Negative for chills and fever  HENT: Negative for drooling and sneezing  Eyes: Negative for redness  Respiratory: Negative for cough and wheezing  Gastrointestinal: Negative for nausea and vomiting  Musculoskeletal: Negative for arthralgias, joint swelling and myalgias  Neurological: Negative for weakness and numbness  Psychiatric/Behavioral: Negative for behavioral problems  The patient is not nervous/anxious            Past Medical History:   Diagnosis Date    Asthma     childhood    Constipation        Past Surgical History:   Procedure Laterality Date    FRACTURE SURGERY Left     tibia    ORIF TIBIA FRACTURE Left 3/23/2017    Procedure: OPEN REDUCTION W/ INTERNAL FIXATION (ORIF) DISTAL TIBIA  ARTICULAR  FRACTURE;  Surgeon: Jose Bone MD;  Location: [place] [address] MAIN OR;  Service:        Family History   Problem Relation Age of Onset    No Known Problems Mother     No Known Problems Father     No Known Problems Sister        Social History     Occupational History    Not on file   Tobacco Use    Smoking status: Former Smoker     Types: E-Cigarettes    Smokeless tobacco: Never Used    Tobacco comment: vape - 20 x day   Vaping Use    Vaping Use: Never used   Substance and Sexual Activity    Alcohol use: No    Drug use: Yes     Frequency: 1 0 times per week     Types: Marijuana    Sexual activity: Yes     Partners: Female     Birth control/protection: Condom Male         Current Outpatient Medications:     albuterol (PROVENTIL HFA,VENTOLIN HFA) 90 mcg/act inhaler, Inhale 2 puffs every 4 (four) hours as needed for wheezing (Patient not taking: Reported on 8/4/2021), Disp: 8 g, Rfl: 0    famotidine (PEPCID) 10 mg tablet, Take 1 tablet (10 mg total) by mouth 2 (two) times a day (Patient not taking: Reported on 4/16/2021), Disp: 14 tablet, Rfl: 0    No Known Allergies    Objective:  Vitals:    08/27/21 1326   BP: 122/69   Pulse: 78       Left Ankle Exam     Range of Motion   Dorsiflexion: 10 Plantar flexion: 30     Muscle Strength   Dorsiflexion:  5/5   Plantar flexion:  5/5     Other   Erythema: absent  Scars: present  Sensation: normal  Pulse: present    Comments:  4/5 eversion   5/5 inversion           Observations   Left Ankle/Foot   Positive for adhesive scar  Strength/Myotome Testing     Left Ankle/Foot   Dorsiflexion: 5  Plantar flexion: 5      Physical Exam  Constitutional:       Appearance: He is well-developed  HENT:      Head: Normocephalic and atraumatic  Eyes:      General:         Right eye: No discharge  Left eye: No discharge  Conjunctiva/sclera: Conjunctivae normal    Cardiovascular:      Rate and Rhythm: Normal rate  Pulmonary:      Effort: Pulmonary effort is normal  No respiratory distress  Musculoskeletal:      Cervical back: Normal range of motion and neck supple  Comments: As noted in HPI   Skin:     General: Skin is warm and dry  Neurological:      Mental Status: He is alert and oriented to person, place, and time  Psychiatric:         Behavior: Behavior normal          Thought Content: Thought content normal          Judgment: Judgment normal          I have personally reviewed pertinent films in PACS and my interpretation is as follows: x-ray left ankle performed in the office today demonstrates mild degenerative changes with anterior osteophyte formation  No signs of hardware failure

## 2021-09-08 ENCOUNTER — EVALUATION (OUTPATIENT)
Dept: PHYSICAL THERAPY | Facility: CLINIC | Age: 21
End: 2021-09-08
Payer: MEDICAID

## 2021-09-08 DIAGNOSIS — M19.172 POST-TRAUMATIC ARTHRITIS OF ANKLE, LEFT: Primary | ICD-10-CM

## 2021-09-08 PROCEDURE — 97161 PT EVAL LOW COMPLEX 20 MIN: CPT

## 2021-09-08 NOTE — PROGRESS NOTES
PT Evaluation     Today's date: 2021  Patient name: Shona Hickman  : 2000  MRN: [id number]  Referring provider: Efrain Ozuna*  Dx:   Encounter Diagnosis     ICD-10-CM    1  Post-traumatic arthritis of ankle, left  M19 172                   Assessment  Assessment details:   CASE SUMMARY:   Shona Hickman is a 22y o  year old male who presents to OPPT upon referral from ortho  secondary to c/o left ankle pain  On aug 26 went to primary care dr due to swelling and bruising and swelling under incision  And progressively worsening pain  Describes bruising with standing and walking for greater then 2 hours    Sera Dixon reports onset of symptoms ~  4 years ago as a result of surgical fixation of tibia fracture  Current symptom location includes on lateral aspect of mid to hind foot with weight bearing on left  and patient describes  current symptoms as: sharp with walking and achy  + swelling and bruising  Symptoms are: constant  Pain level:  Current: 5/10  At Best: 5/10   At worst: 9-10/10 and with ADL's 7/10  Symptoms improve with: rest , and worsen with walking and standing  Overall symptom progression at this time is wrosening  Previous injury to this area: fell into a pot hole and fractured left tibia by twisting it  Previous treatment includes: surgery, PT ~ 6 months  Diagnostic testing includes: xray   States he may need additional surgery in the future per ortho  PMHx includes: See chart for full details with medications, allergies, past family history, past medical history, social history, past surgical history and problem list  All topics were reviewed  Functionally, at baseline, Sera Dixon is independent with all basic ADL's and advanced ADL's  Currently, Sera Dixon is limited in the following activities: walking greater then 2 hours, pain with stairs, difficulty swimming, hiking        Sera Dixon is lives with family in a 3 story home with 5 stairs to enter and flight  stairs inside with + [employer]  Recreational activities include: none   Sirisha Silver is employed at [employer] and is currently on leave until he is cleared  Patient goal(s) for physical therapy is: to improve his ROM and hopefully lessen the pain  The following is a summary of [place] objective status:    Impairments:   Postural dysfunction  Reduced ROM: left ankle  Reduced strength  + TTP: left ankle  Reduced flexibility: left calf  Gait/elevation dysfunction  Reduced stability  Reduced activity tolerance including above stated functional limitations    Patient's clinical presentation is consistent with their referring diagnosis of: left ankle post traumatic arthritis   Patient presents with reduced ROM, gait deviations, reduced balance and flexibility in left ankle resulting in reduced funciton  Patient will benefit form PT to address these deficits and improve function  Patients mother present for evaluation    PLOC was discussed and agreed upon with patient  Patient was educated on: HEP and [address]   Patient vocalized a good understanding of  PLOC and HEP issued  Bairon Marshall would benefit from skilled physical therapy services to address their aforementioned functional limitations and progress towards prior level of function, to meet stated goals,  and independence with home exercise program   Prognosis for successful rehab outcome is good with consistent participation in therapy and carryover of HEP and PLOC  FOTO score is 32% with a 61% prediction in function       Functional limitations: per patient subjectiveUnderstanding of Dx/Px/POC: good   Prognosis: good    Goals  STG:  + Patient will have pain level 2/10 left ankle  at rest (2-3 weeks)  + Patient will report a 35% improvement in symptoms (2-3 weeks)   + Patient will be independent in basic HEP (2-3 weeks)  + Patient will demonstrate an increase in range of motion left ankle DF   to  8 AROM and 10 PROM (3 weeks)  + Patient will increase SLS time by 10  seconds (2-3 weeks)  + Patient will report increased ease walking due to a reduction in pain (2-3 weeks)    LTG:  + Patient will have pain level 3/10 left ankle  with ADL's (4-6 weeks)  + Patient will report a 60% improvement in symptoms (4-6 weeks)   + Patient will increase FOTO score by 10 points  (10 sessions)   + Patient will be independent in comprehensive HEP (4-6 weeks)  + Patient will demonstrate no gait deviations ambulating on level surfaces  (4-6 week)  + Patient will negotiate stairs reciprically with use of one railing consistently  (4-6 weeks)  + Patient will return to work due to a reduction in pain (4-6 weeks)    Plan  Plan details:  2-3 x week, 4-6 weeks: HEP development, stretching LE musculature per objective findings, strengthening LE musculature per objective findings, A/AA/PROM ankle motions, joint mobilizations prn, posture education especially foot posture prn, STM/MI as needed to reduce muscle tension as per objective findings, muscle reeducation prn, gait/balance training, stability training, Boudreaux/Marii prn PLOC discussed and agreed upon with patient  Patient would benefit from: skilled physical therapy  Frequency: 2x week  Plan of Care beginning date: 9/8/2021  Plan of Care expiration date: 11/3/2021  Treatment plan discussed with: patient        Subjective    Objective     Static Posture     Comments  Posture:reduced arches josy, calcaneal neutral josy     Gait:no assistive device, increased arm swing on left, reduced arm swing on right, reduced push off on left   Elevations: reciprical unless inc pain then step to    Functional activities  SLS: Right 40 sec    Left: 23 sec + pain    Squat: redued balance, reduced ankle DF  + pain    Palpation: + Tenderness beneath lateral malleolus    Girth (figure 8):  Right:  53 5 cm,  Left 53 2 cm    ROM  Ankle Dorsiflexion: Right: 15    Left: 0  + pain across anterior TC joint  Ankle Plantarflexion: Right: 48 Left: 48     Ankle Inversion: Right: 45  Left: 40  + pain Ankle Eversion: Right: 12  Left: 6  no pain    First MTP flexion: Right: wnl   Left: wnl     First MTP extension: Right: wnl   Left: wnl       Joint mobility:   Metatarsal: Right: wnl   Left: wnl       MMT  Ankle Dorsiflexion: Right: 4 /5  left: 4/5     Ankle Plantarflexion: Right: 4/5  Left: 4/5     Ankle Inversion: Right: 4/5   Left: 4/5     Ankle Eversion: Right: 4 /5  Left: 4/5     Bilateral heel raise: 10 x  Unilateral heel raise: Right:  10x   Left 10 x limited ROM  + pain     Flexibility  Gastroc/soleus: Right:  wnl  Left fair-             Precautions: asthma    Specialty Daily Treatment Diary     Insurance:         Visits: 1       Manual 9/8/21       PROM Ankle: all directions        IASTM w/ roll out stick calf        Joint mobs: TC joint                                Exercise Diary         PROTOCOL:         Ther ex:         Rec bike/nustep        prostretch        Calf stretch with SOS instruct       Ankle Tband 4 way        Ankle Df stretch on step        Towel scrunches        Bilateral heel raises        Kneeling PF stretch        Ankle DF stretch with AP glide using band                Neuro Poncho:        Bapps Bd:    DF/PF  INV/EV  CW/CCW        Ankle alphabets instruct       SLS        Rockerboard:  DF/PF  INV/EV                Ther Activity:        Reevaluation                Gait Training:                 HEP:                Modalities:        MH        Ice        Total time:                   Access Code: [email or web address]  URL: [email or web address]  Date: 09/08/2021  Prepared by:  Jolly Barton    Exercises  Long Sitting Calf Stretch with Strap - 2 x daily - 7 x weekly - 1 sets - 5 reps - 10 sec hold  Seated Ankle Alphabet - 2 x daily - 7 x weekly - 1 sets - 1 reps  Standing Ankle Dorsiflexion Stretch - 2 x daily - 7 x weekly - 1 sets - 10 reps - 5 sec hold

## 2021-09-14 ENCOUNTER — TELEPHONE (OUTPATIENT)
Dept: PHYSICAL THERAPY | Facility: CLINIC | Age: 21
End: 2021-09-14

## 2021-09-17 ENCOUNTER — OFFICE VISIT (OUTPATIENT)
Dept: PHYSICAL THERAPY | Facility: CLINIC | Age: 21
End: 2021-09-17
Payer: MEDICAID

## 2021-09-17 DIAGNOSIS — M19.172 POST-TRAUMATIC ARTHRITIS OF ANKLE, LEFT: Primary | ICD-10-CM

## 2021-09-17 PROCEDURE — 97110 THERAPEUTIC EXERCISES: CPT

## 2021-09-17 PROCEDURE — 97140 MANUAL THERAPY 1/> REGIONS: CPT

## 2021-09-17 NOTE — PROGRESS NOTES
Daily Note     Today's date: 2021  Patient name: Avila Arshad  : 2000  MRN: [id number]  Referring provider: Flor Wright*  Dx:   Encounter Diagnosis     ICD-10-CM    1  Post-traumatic arthritis of ankle, left  M19 172                   Subjective: Pt reports no pain prior to session, he reports he experienced pain yesterday which was 9/10 and described them as "sharp in front of ankle"      Objective: See treatment diary below      Assessment: Tolerated treatment well  Patient would benefit from continued PT  Pt demo difficulty with tband exercises in Evr/Inv, given tband for HEP  Plan: Continue per plan of care  Precautions: asthma    Specialty Daily Treatment Diary     Insurance:         Visits: 1 2      Manual 21      PROM Ankle: all directions  performed      IASTM w/ roll out stick calf        Joint mobs: TC joint                                Exercise Diary         PROTOCOL:         Ther ex:         Rec bike/nustep  5' min pre rec      prostretch  5x10s      Calf stretch with SOS instruct 5x10s      Ankle Tband 4 way        Ankle Df stretch on step  3sx20      Towel scrunches        Bilateral heel raises  Seated 20x      Kneeling PF stretch        Ankle DF stretch with AP glide using band                Neuro Poncho:        Bapps Bd:    DF/PF  INV/EV  CW/CCW  20x ea directions      Ankle alphabets instruct 1x      SLS        Rockerboard:  DF/PF  INV/EV                Ther Activity:        Reevaluation                Gait Training:                 HEP:                Modalities:        MH        Ice        Total time:                   Access Code: [email or web address]  URL: [email or web address]  Date: 2021  Prepared by:  Nicole Honeycutt    Exercises  Long Sitting Calf Stretch with Strap - 2 x daily - 7 x weekly - 1 sets - 5 reps - 10 sec hold  Seated Ankle Alphabet - 2 x daily - 7 x weekly - 1 sets - 1 reps  Standing Ankle Dorsiflexion Stretch - 2 x daily - 7 x weekly - 1 sets - 10 reps - 5 sec hold

## 2021-09-21 ENCOUNTER — OFFICE VISIT (OUTPATIENT)
Dept: PHYSICAL THERAPY | Facility: CLINIC | Age: 21
End: 2021-09-21
Payer: MEDICAID

## 2021-09-21 DIAGNOSIS — M19.172 POST-TRAUMATIC ARTHRITIS OF ANKLE, LEFT: Primary | ICD-10-CM

## 2021-09-21 PROCEDURE — 97110 THERAPEUTIC EXERCISES: CPT

## 2021-09-21 PROCEDURE — 97112 NEUROMUSCULAR REEDUCATION: CPT

## 2021-09-21 NOTE — PROGRESS NOTES
Daily Note     Today's date: 2021  Patient name: Amy Che  : 2000  MRN: [id number]  Referring provider: Henrik Galo*  Dx:   Encounter Diagnosis     ICD-10-CM    1  Post-traumatic arthritis of ankle, left  M19 172        Start Time: 1230  Stop Time: 1320  Total time in clinic (min): 50 minutes    Subjective: Pt reports 6/10 pain L ankle  today  "I haven't done very much today so its stiff and in a bit of pain"  Pt noted soreness and stiffness L ankle post PT last session "it was mainly because I was doing the motions left to right, and I have no motion in that direction, but it feels better since then"  Objective: See treatment diary below      Assessment: Tolerated treatment well  Patient would benefit from continued PT  Pt educated on proper shoe wear prior to exercise due to pt arriving to clinic with sandals on  Pt tolerated TC joint mobilizations and mulligan mobilization with movement well with no increase in pain L ankle, Pt had greater restriction in L DF treated which was addressed with posterior glide  Pt required VC in order to not ER leg during ankle 4-way eversion which was challenging for him  Pt noted continuted difficulty with BAPS CW and CCW motions with L ankle  He required cuing to prevent knee/hip movement during inversion and eversion on BAPS  Ankle instability noted with L SLS, pt required cuing to maintain an upright trunk and contract core  Pt had decreased DF range when performing L soleus stretch indicating there is significant restriction in that muscle preventing further range  Pt educated on proper form to perform L anterior self-mobilization with movement at home with band and verbalized understanding  Pt reports L ankle "feels looser after therapy"  Pt treated by Srikanth Cardoza, SHARRON and Supervised/Co-treated by Kenney Landin, PT     Plan: Continue per plan of care  STM of L soleus in order to reduce muscle restriction preventing further DF  Precautions: asthma    Specialty Daily Treatment Diary     Insurance:         Visits: 1 2 3     Manual 9/8/21 9/17/21 9/21/21     PROM Ankle: all directions  performed Performed by SPT      IASTM w/ roll out stick calf        Joint mobs: TC joint   anterior and posterior glides Performed by SPT    MWM anterior glide                              Exercise Diary         PROTOCOL:         Ther ex:         Rec bike/nustep  5' min pre rec 5' min Lv 3    13 miles      prostretch  5x10s 5 x 10s      Calf stretch with SOS instruct 5x10s D/C     Ankle Tband 4 way   Blue TB 2x10      Ankle Df stretch on step  3sx20 3 x 20s      Towel scrunches        Bilateral heel raises  Seated 20x Standing 20x      Kneeling PF stretch   5s x 10      Ankle DF stretch with AP glide using band   3s x 10      Soleus stretch    5s x10              Neuro Poncho:        Bapps Bd:    DF/PF  INV/EV  CW/CCW  20x ea directions L3 20x each direction      Ankle alphabets instruct 1x      SLS   30s x3      Rockerboard:  DF/PF  INV/EV   Standing:   INV/EV x 20   DF/PF: x 20              Ther Activity:        Reevaluation                Gait Training:                 HEP:                Modalities:        MH        Ice   Pt deferred      Total time:                   Access Code: [email or web address]  URL: [email or web address]  Date: 09/08/2021  Prepared by:  Tg Mendoza    Exercises  Long Sitting Calf Stretch with Strap - 2 x daily - 7 x weekly - 1 sets - 5 reps - 10 sec hold  Seated Ankle Alphabet - 2 x daily - 7 x weekly - 1 sets - 1 reps  Standing Ankle Dorsiflexion Stretch - 2 x daily - 7 x weekly - 1 sets - 10 reps - 5 sec hold

## 2021-09-24 ENCOUNTER — OFFICE VISIT (OUTPATIENT)
Dept: PHYSICAL THERAPY | Facility: CLINIC | Age: 21
End: 2021-09-24
Payer: MEDICAID

## 2021-09-24 DIAGNOSIS — M19.172 POST-TRAUMATIC ARTHRITIS OF ANKLE, LEFT: Primary | ICD-10-CM

## 2021-09-24 PROCEDURE — 97110 THERAPEUTIC EXERCISES: CPT

## 2021-09-24 PROCEDURE — 97140 MANUAL THERAPY 1/> REGIONS: CPT

## 2021-09-24 PROCEDURE — 97112 NEUROMUSCULAR REEDUCATION: CPT

## 2021-09-24 NOTE — PROGRESS NOTES
Daily Note         Today's date: 2021  Patient name: Sandra Lewis  : 2000  MRN: [id number]  Referring provider: Yesika Mcclain*  Dx:   Encounter Diagnosis     ICD-10-CM    1  Post-traumatic arthritis of ankle, left  M19 172        Subjective: Sandra Lewis reports pain level enteirng today is 6/10  Objective: See treatment diary below    Assessment:  reports counter cw is the hardest direction for bapps board  was able to inc challenge with SLS to on foam    patient with good ROM in quad to sit stretching PF ROM  The goal of the current treatment is to address patient's functional limitations as well as objective findings         Plan: conitnue to foucs on strength and ROM        Precautions: asthma    Specialty Daily Treatment Diary     Insurance:         Visits: 1 2 3 4    Manual 21    PROM Ankle: all directions  performed Performed by SPT  Performed     IASTM w/ roll out stick calf        Joint mobs: TC joint   anterior and posterior glides Performed by SPT    MWM anterior glide  MWM: PF, PF/INV  AP TC joint performed                            Exercise Diary         PROTOCOL:         Ther ex:         Rec bike/nustep  5' min pre rec 5' min Lv 3    13 miles  Upright bike 10 min lv 4     prostretch  5x10s 5 x 10s  10 sec x 5      Ankle Tband 4 way   Blue TB 2x10  Blue: 2 x 10      Ankle Df stretch on step  3sx20 3 x 20s  ---    Towel scrunches        Bilateral heel raises  Seated 20x Standing 20x  2 x 10      Kneeling PF stretch   5s x 10  5 sec x 10      Ankle DF stretch with AP glide using band   3s x 10  Manual AP glide: 10 x     Soleus stretch    5s x10  5 sec x 10              Neuro Poncho:        Bapadelia Bd:    DF/PF  INV/EV  CW/CCW  20x ea directions L3 20x each direction  lv 3 sitting 10 x each direction     Ankle alphabets instruct 1x  1 x     SLS   30s x3  On foam 15 sec x  5    Rockerboard:  DF/PF  INV/EV   Standing:   INV/EV x 20   DF/PF: x 20  Standin x each direction             Ther Activity:        Reevaluation                Gait Training:                 HEP:                Modalities:        MH        Ice   Pt deferred      Total time:                   Access Code: [email or web address]  URL: [email or web address]  Date: 2021  Prepared by:  Jolly Barton    Exercises  Long Sitting Calf Stretch with Strap - 2 x daily - 7 x weekly - 1 sets - 5 reps - 10 sec hold  Seated Ankle Alphabet - 2 x daily - 7 x weekly - 1 sets - 1 reps  Standing Ankle Dorsiflexion Stretch - 2 x daily - 7 x weekly - 1 sets - 10 reps - 5 sec hold

## 2021-10-01 ENCOUNTER — OFFICE VISIT (OUTPATIENT)
Dept: PHYSICAL THERAPY | Facility: CLINIC | Age: 21
End: 2021-10-01
Payer: MEDICAID

## 2021-10-01 DIAGNOSIS — M19.172 POST-TRAUMATIC ARTHRITIS OF ANKLE, LEFT: Primary | ICD-10-CM

## 2021-10-01 PROCEDURE — 97110 THERAPEUTIC EXERCISES: CPT

## 2021-10-01 PROCEDURE — 97140 MANUAL THERAPY 1/> REGIONS: CPT

## 2021-10-01 PROCEDURE — 97112 NEUROMUSCULAR REEDUCATION: CPT

## 2021-10-04 ENCOUNTER — OFFICE VISIT (OUTPATIENT)
Dept: PHYSICAL THERAPY | Facility: CLINIC | Age: 21
End: 2021-10-04
Payer: MEDICAID

## 2021-10-04 DIAGNOSIS — M19.172 POST-TRAUMATIC ARTHRITIS OF ANKLE, LEFT: Primary | ICD-10-CM

## 2021-10-04 PROCEDURE — 97140 MANUAL THERAPY 1/> REGIONS: CPT

## 2021-10-04 PROCEDURE — 97110 THERAPEUTIC EXERCISES: CPT

## 2021-10-06 ENCOUNTER — EVALUATION (OUTPATIENT)
Dept: PHYSICAL THERAPY | Facility: CLINIC | Age: 21
End: 2021-10-06
Payer: MEDICAID

## 2021-10-06 DIAGNOSIS — M19.172 POST-TRAUMATIC ARTHRITIS OF ANKLE, LEFT: Primary | ICD-10-CM

## 2021-10-06 PROCEDURE — 97530 THERAPEUTIC ACTIVITIES: CPT

## 2021-10-06 PROCEDURE — 97110 THERAPEUTIC EXERCISES: CPT

## 2021-10-06 PROCEDURE — 97140 MANUAL THERAPY 1/> REGIONS: CPT

## 2021-10-08 ENCOUNTER — OFFICE VISIT (OUTPATIENT)
Dept: OBGYN CLINIC | Facility: CLINIC | Age: 21
End: 2021-10-08
Payer: MEDICAID

## 2021-10-08 VITALS — TEMPERATURE: 98.8 F | SYSTOLIC BLOOD PRESSURE: 127 MMHG | DIASTOLIC BLOOD PRESSURE: 77 MMHG | HEART RATE: 56 BPM

## 2021-10-08 DIAGNOSIS — M19.172 POST-TRAUMATIC ARTHRITIS OF LEFT ANKLE: Primary | ICD-10-CM

## 2021-10-08 DIAGNOSIS — Z87.81 HX OF FRACTURE OF TIBIA: ICD-10-CM

## 2021-10-08 PROCEDURE — 99213 OFFICE O/P EST LOW 20 MIN: CPT | Performed by: ORTHOPAEDIC SURGERY

## 2021-10-11 ENCOUNTER — OFFICE VISIT (OUTPATIENT)
Dept: PHYSICAL THERAPY | Facility: CLINIC | Age: 21
End: 2021-10-11
Payer: MEDICAID

## 2021-10-11 DIAGNOSIS — M19.172 POST-TRAUMATIC ARTHRITIS OF ANKLE, LEFT: Primary | ICD-10-CM

## 2021-10-11 PROCEDURE — 97112 NEUROMUSCULAR REEDUCATION: CPT

## 2021-10-11 PROCEDURE — 97110 THERAPEUTIC EXERCISES: CPT

## 2021-10-13 ENCOUNTER — OFFICE VISIT (OUTPATIENT)
Dept: PHYSICAL THERAPY | Facility: CLINIC | Age: 21
End: 2021-10-13
Payer: MEDICAID

## 2021-10-13 DIAGNOSIS — M19.172 POST-TRAUMATIC ARTHRITIS OF ANKLE, LEFT: Primary | ICD-10-CM

## 2021-10-13 PROCEDURE — 97140 MANUAL THERAPY 1/> REGIONS: CPT

## 2021-10-13 PROCEDURE — 97110 THERAPEUTIC EXERCISES: CPT

## 2021-10-13 PROCEDURE — 97112 NEUROMUSCULAR REEDUCATION: CPT

## 2021-10-18 ENCOUNTER — OFFICE VISIT (OUTPATIENT)
Dept: PHYSICAL THERAPY | Facility: CLINIC | Age: 21
End: 2021-10-18
Payer: MEDICAID

## 2021-10-18 DIAGNOSIS — M19.172 POST-TRAUMATIC ARTHRITIS OF ANKLE, LEFT: Primary | ICD-10-CM

## 2021-10-18 PROCEDURE — 97110 THERAPEUTIC EXERCISES: CPT

## 2021-10-18 PROCEDURE — 97112 NEUROMUSCULAR REEDUCATION: CPT

## 2021-10-20 ENCOUNTER — OFFICE VISIT (OUTPATIENT)
Dept: PHYSICAL THERAPY | Facility: CLINIC | Age: 21
End: 2021-10-20
Payer: MEDICAID

## 2021-10-20 DIAGNOSIS — M19.172 POST-TRAUMATIC ARTHRITIS OF ANKLE, LEFT: Primary | ICD-10-CM

## 2021-10-20 PROCEDURE — 97112 NEUROMUSCULAR REEDUCATION: CPT

## 2021-10-20 PROCEDURE — 97110 THERAPEUTIC EXERCISES: CPT

## 2021-10-25 ENCOUNTER — OFFICE VISIT (OUTPATIENT)
Dept: PHYSICAL THERAPY | Facility: CLINIC | Age: 21
End: 2021-10-25
Payer: MEDICAID

## 2021-10-25 DIAGNOSIS — M19.172 POST-TRAUMATIC ARTHRITIS OF ANKLE, LEFT: Primary | ICD-10-CM

## 2021-10-25 PROCEDURE — 97112 NEUROMUSCULAR REEDUCATION: CPT

## 2021-10-25 PROCEDURE — 97110 THERAPEUTIC EXERCISES: CPT

## 2021-10-27 ENCOUNTER — OFFICE VISIT (OUTPATIENT)
Dept: PHYSICAL THERAPY | Facility: CLINIC | Age: 21
End: 2021-10-27
Payer: MEDICAID

## 2021-10-27 DIAGNOSIS — M19.172 POST-TRAUMATIC ARTHRITIS OF ANKLE, LEFT: Primary | ICD-10-CM

## 2021-10-27 PROCEDURE — 97112 NEUROMUSCULAR REEDUCATION: CPT

## 2021-10-27 PROCEDURE — 97110 THERAPEUTIC EXERCISES: CPT

## 2021-11-01 ENCOUNTER — OFFICE VISIT (OUTPATIENT)
Dept: PHYSICAL THERAPY | Facility: CLINIC | Age: 21
End: 2021-11-01
Payer: MEDICAID

## 2021-11-01 DIAGNOSIS — M19.172 POST-TRAUMATIC ARTHRITIS OF ANKLE, LEFT: Primary | ICD-10-CM

## 2021-11-01 PROCEDURE — 97110 THERAPEUTIC EXERCISES: CPT

## 2021-11-01 PROCEDURE — 97112 NEUROMUSCULAR REEDUCATION: CPT

## 2021-11-03 ENCOUNTER — EVALUATION (OUTPATIENT)
Dept: PHYSICAL THERAPY | Facility: CLINIC | Age: 21
End: 2021-11-03
Payer: MEDICAID

## 2021-11-03 DIAGNOSIS — M19.172 POST-TRAUMATIC ARTHRITIS OF ANKLE, LEFT: Primary | ICD-10-CM

## 2021-11-03 PROCEDURE — 97110 THERAPEUTIC EXERCISES: CPT

## 2021-11-03 PROCEDURE — 97530 THERAPEUTIC ACTIVITIES: CPT

## 2021-12-01 ENCOUNTER — OFFICE VISIT (OUTPATIENT)
Dept: OBGYN CLINIC | Facility: CLINIC | Age: 21
End: 2021-12-01
Payer: MEDICAID

## 2021-12-01 VITALS
DIASTOLIC BLOOD PRESSURE: 69 MMHG | HEIGHT: 70 IN | HEART RATE: 73 BPM | BODY MASS INDEX: 25.74 KG/M2 | SYSTOLIC BLOOD PRESSURE: 111 MMHG | WEIGHT: 179.8 LBS

## 2021-12-01 DIAGNOSIS — M19.172 POST-TRAUMATIC ARTHRITIS OF LEFT ANKLE: Primary | ICD-10-CM

## 2021-12-01 DIAGNOSIS — Z87.81 HX OF FRACTURE OF TIBIA: ICD-10-CM

## 2021-12-01 PROCEDURE — 99213 OFFICE O/P EST LOW 20 MIN: CPT | Performed by: ORTHOPAEDIC SURGERY

## 2021-12-03 ENCOUNTER — TELEPHONE (OUTPATIENT)
Dept: OBGYN CLINIC | Facility: HOSPITAL | Age: 21
End: 2021-12-03

## 2021-12-28 ENCOUNTER — IMMUNIZATIONS (OUTPATIENT)
Dept: FAMILY MEDICINE CLINIC | Facility: HOSPITAL | Age: 21
End: 2021-12-28

## 2021-12-28 DIAGNOSIS — Z23 ENCOUNTER FOR IMMUNIZATION: Primary | ICD-10-CM

## 2021-12-28 PROCEDURE — 0011A COVID-19 MODERNA VACC 0.5 ML: CPT

## 2021-12-28 PROCEDURE — 91301 COVID-19 MODERNA VACC 0.5 ML: CPT

## 2022-01-28 ENCOUNTER — IMMUNIZATIONS (OUTPATIENT)
Dept: FAMILY MEDICINE CLINIC | Facility: HOSPITAL | Age: 22
End: 2022-01-28

## 2022-01-28 DIAGNOSIS — Z23 ENCOUNTER FOR IMMUNIZATION: Primary | ICD-10-CM

## 2022-01-28 PROCEDURE — 0012A COVID-19 MODERNA VACC 0.5 ML: CPT

## 2022-01-28 PROCEDURE — 91301 COVID-19 MODERNA VACC 0.5 ML: CPT

## 2022-04-29 ENCOUNTER — APPOINTMENT (EMERGENCY)
Dept: RADIOLOGY | Facility: HOSPITAL | Age: 22
End: 2022-04-29
Payer: MEDICAID

## 2022-04-29 ENCOUNTER — HOSPITAL ENCOUNTER (EMERGENCY)
Facility: HOSPITAL | Age: 22
Discharge: HOME | End: 2022-04-29
Attending: EMERGENCY MEDICINE
Payer: MEDICAID

## 2022-04-29 VITALS
SYSTOLIC BLOOD PRESSURE: 137 MMHG | WEIGHT: 172.6 LBS | TEMPERATURE: 98.4 F | DIASTOLIC BLOOD PRESSURE: 82 MMHG | HEART RATE: 72 BPM | BODY MASS INDEX: 24.77 KG/M2 | RESPIRATION RATE: 19 BRPM | OXYGEN SATURATION: 98 %

## 2022-04-29 DIAGNOSIS — M25.579 ANKLE PAIN: ICD-10-CM

## 2022-04-29 DIAGNOSIS — K29.70 GASTRITIS: ICD-10-CM

## 2022-04-29 DIAGNOSIS — R10.9 ABDOMINAL PAIN: Primary | ICD-10-CM

## 2022-04-29 LAB
ALBUMIN SERPL BCP-MCNC: 4.5 G/DL (ref 3.5–5)
ALP SERPL-CCNC: 86 U/L (ref 46–116)
ALT SERPL W P-5'-P-CCNC: 33 U/L (ref 12–78)
ANION GAP SERPL CALCULATED.3IONS-SCNC: 8 MMOL/L (ref 4–13)
APTT PPP: 28 SECONDS (ref 23–37)
AST SERPL W P-5'-P-CCNC: 20 U/L (ref 5–45)
BASOPHILS # BLD AUTO: 0.04 THOUSANDS/ΜL (ref 0–0.1)
BASOPHILS NFR BLD AUTO: 1 % (ref 0–1)
BILIRUB SERPL-MCNC: 1.38 MG/DL (ref 0.2–1)
BILIRUB UR QL STRIP: NEGATIVE
BUN SERPL-MCNC: 10 MG/DL (ref 5–25)
CALCIUM SERPL-MCNC: 8.8 MG/DL (ref 8.3–10.1)
CHLORIDE SERPL-SCNC: 105 MMOL/L (ref 100–108)
CLARITY UR: CLEAR
CO2 SERPL-SCNC: 28 MMOL/L (ref 21–32)
COLOR UR: COLORLESS
CREAT SERPL-MCNC: 0.94 MG/DL (ref 0.6–1.3)
EOSINOPHIL # BLD AUTO: 0.02 THOUSAND/ΜL (ref 0–0.61)
EOSINOPHIL NFR BLD AUTO: 0 % (ref 0–6)
ERYTHROCYTE [DISTWIDTH] IN BLOOD BY AUTOMATED COUNT: 12.7 % (ref 11.6–15.1)
GFR SERPL CREATININE-BSD FRML MDRD: 115 ML/MIN/1.73SQ M
GLUCOSE SERPL-MCNC: 91 MG/DL (ref 65–140)
GLUCOSE UR STRIP-MCNC: NEGATIVE MG/DL
HCT VFR BLD AUTO: 49.4 % (ref 36.5–49.3)
HGB BLD-MCNC: 16.7 G/DL (ref 12–17)
HGB UR QL STRIP.AUTO: NEGATIVE
IMM GRANULOCYTES # BLD AUTO: 0.03 THOUSAND/UL (ref 0–0.2)
IMM GRANULOCYTES NFR BLD AUTO: 0 % (ref 0–2)
INR PPP: 1.09 (ref 0.84–1.19)
KETONES UR STRIP-MCNC: NEGATIVE MG/DL
LEUKOCYTE ESTERASE UR QL STRIP: NEGATIVE
LIPASE SERPL-CCNC: 227 U/L (ref 73–393)
LYMPHOCYTES # BLD AUTO: 2.11 THOUSANDS/ΜL (ref 0.6–4.47)
LYMPHOCYTES NFR BLD AUTO: 28 % (ref 14–44)
MCH RBC QN AUTO: 28.1 PG (ref 26.8–34.3)
MCHC RBC AUTO-ENTMCNC: 33.8 G/DL (ref 31.4–37.4)
MCV RBC AUTO: 83 FL (ref 82–98)
MONOCYTES # BLD AUTO: 0.75 THOUSAND/ΜL (ref 0.17–1.22)
MONOCYTES NFR BLD AUTO: 10 % (ref 4–12)
NEUTROPHILS # BLD AUTO: 4.59 THOUSANDS/ΜL (ref 1.85–7.62)
NEUTS SEG NFR BLD AUTO: 61 % (ref 43–75)
NITRITE UR QL STRIP: NEGATIVE
NRBC BLD AUTO-RTO: 0 /100 WBCS
PH UR STRIP.AUTO: 7 [PH]
PLATELET # BLD AUTO: 285 THOUSANDS/UL (ref 149–390)
PMV BLD AUTO: 8.7 FL (ref 8.9–12.7)
POTASSIUM SERPL-SCNC: 3.8 MMOL/L (ref 3.5–5.3)
PROT SERPL-MCNC: 8 G/DL (ref 6.4–8.2)
PROT UR STRIP-MCNC: NEGATIVE MG/DL
PROTHROMBIN TIME: 13.8 SECONDS (ref 11.6–14.5)
RBC # BLD AUTO: 5.94 MILLION/UL (ref 3.88–5.62)
SODIUM SERPL-SCNC: 141 MMOL/L (ref 136–145)
SP GR UR STRIP.AUTO: <=1.005 (ref 1–1.03)
UROBILINOGEN UR QL STRIP.AUTO: 0.2 E.U./DL
WBC # BLD AUTO: 7.54 THOUSAND/UL (ref 4.31–10.16)

## 2022-04-29 PROCEDURE — G1004 CDSM NDSC: HCPCS

## 2022-04-29 PROCEDURE — 80053 COMPREHEN METABOLIC PANEL: CPT | Performed by: EMERGENCY MEDICINE

## 2022-04-29 PROCEDURE — 85730 THROMBOPLASTIN TIME PARTIAL: CPT | Performed by: EMERGENCY MEDICINE

## 2022-04-29 PROCEDURE — 85025 COMPLETE CBC W/AUTO DIFF WBC: CPT | Performed by: EMERGENCY MEDICINE

## 2022-04-29 PROCEDURE — 96360 HYDRATION IV INFUSION INIT: CPT

## 2022-04-29 PROCEDURE — 83690 ASSAY OF LIPASE: CPT | Performed by: EMERGENCY MEDICINE

## 2022-04-29 PROCEDURE — 73610 X-RAY EXAM OF ANKLE: CPT

## 2022-04-29 PROCEDURE — 74177 CT ABD & PELVIS W/CONTRAST: CPT

## 2022-04-29 PROCEDURE — 96361 HYDRATE IV INFUSION ADD-ON: CPT

## 2022-04-29 PROCEDURE — 36415 COLL VENOUS BLD VENIPUNCTURE: CPT | Performed by: EMERGENCY MEDICINE

## 2022-04-29 PROCEDURE — 99284 EMERGENCY DEPT VISIT MOD MDM: CPT

## 2022-04-29 PROCEDURE — 85610 PROTHROMBIN TIME: CPT | Performed by: EMERGENCY MEDICINE

## 2022-04-29 PROCEDURE — 99284 EMERGENCY DEPT VISIT MOD MDM: CPT | Performed by: EMERGENCY MEDICINE

## 2022-04-29 PROCEDURE — 81003 URINALYSIS AUTO W/O SCOPE: CPT | Performed by: EMERGENCY MEDICINE

## 2022-04-29 RX ORDER — PANTOPRAZOLE SODIUM 20 MG/1
20 TABLET, DELAYED RELEASE ORAL DAILY
Qty: 30 TABLET | Refills: 0 | Status: SHIPPED | OUTPATIENT
Start: 2022-04-29

## 2022-04-29 RX ADMIN — IOHEXOL 100 ML: 350 INJECTION, SOLUTION INTRAVENOUS at 17:17

## 2022-04-29 RX ADMIN — SODIUM CHLORIDE 1000 ML: 0.9 INJECTION, SOLUTION INTRAVENOUS at 16:32

## 2022-04-29 NOTE — Clinical Note
Selena Guaman was seen and treated in our emergency department on 4/29/2022  Diagnosis:     Marixa Sultana  may return to work on return date  He may return on this date: 05/04/2022         If you have any questions or concerns, please don't hesitate to call        Kevin Gutierrez MD    ______________________________           _______________          _______________  Hospital Representative                              Date                                Time

## 2022-04-29 NOTE — ED PROVIDER NOTES
History  Chief Complaint   Patient presents with    Ankle Pain     States ankle surgery/hardware in 2017 and having pain left ankle since surgery but pain became worse 2 weeks ago     Abdominal Pain     States abdominal cramping for 3 weeks  Having bm's with " blood at the end "  States had 10 lb weight loss in 3 weeks or less , unintentional        Patient has complaints of poorly localized mid abdominal pain on either side of the umbilicus which has been present for about 3 weeks  It is intermittent sometimes crampy  Patient states he sometimes has bright red blood per rectum at the end of bowel movements, without constipation  He states he has lost 10 lb in the last 3 weeks  No fever  No vomiting  Patient also is complaining of pain in the right lateral ankle  He has orthopedic hardware present from a prior operation  He has talked to his orthopedist who advised against removing the hardware and told him he would likely need further surgery on the ankle later  He had diagnosed posttraumatic arthritis in the ankle          Prior to Admission Medications   Prescriptions Last Dose Informant Patient Reported? Taking?    albuterol (PROVENTIL HFA,VENTOLIN HFA) 90 mcg/act inhaler   No No   Sig: Inhale 2 puffs every 4 (four) hours as needed for wheezing   Patient not taking: Reported on 8/4/2021      Facility-Administered Medications: None       Past Medical History:   Diagnosis Date    Asthma     childhood    Constipation        Past Surgical History:   Procedure Laterality Date    FRACTURE SURGERY Left     tibia    ORIF TIBIA FRACTURE Left 3/23/2017    Procedure: OPEN REDUCTION W/ INTERNAL FIXATION (ORIF) DISTAL TIBIA  ARTICULAR  FRACTURE;  Surgeon: Tushar Steen MD;  Location: [place] [address] MAIN OR;  Service:        Family History   Problem Relation Age of Onset    No Known Problems Mother     No Known Problems Father     No Known Problems Sister      I have reviewed and agree with the history as documented  E-Cigarette/Vaping    E-Cigarette Use Never User      E-Cigarette/Vaping Substances    Nicotine No     THC No     CBD No     Flavoring No     Other No     Unknown No      Social History     Tobacco Use    Smoking status: Former Smoker     Types: E-Cigarettes    Smokeless tobacco: Never Used    Tobacco comment: vape - 20 x day   Vaping Use    Vaping Use: Never used   Substance Use Topics    Alcohol use: No    Drug use: Yes     Frequency: 1 0 times per week     Types: Marijuana     Comment: medical card       Review of Systems   Constitutional: Positive for unexpected weight change  Negative for chills, diaphoresis and fever  HENT: Negative for congestion and sore throat  Eyes: Negative for visual disturbance  Respiratory: Negative for cough and shortness of breath  Cardiovascular: Negative for chest pain  Gastrointestinal: Positive for abdominal pain, anal bleeding and blood in stool  Negative for constipation, diarrhea, nausea and vomiting  Genitourinary: Negative for difficulty urinating and dysuria  Musculoskeletal: Positive for arthralgias and joint swelling  Negative for back pain  Skin: Negative for wound  Neurological: Negative for weakness and headaches  Hematological: Does not bruise/bleed easily  Psychiatric/Behavioral: Negative for confusion  All other systems reviewed and are negative  Physical Exam  Physical Exam  Vitals and nursing note reviewed  Constitutional:       Appearance: He is well-developed  HENT:      Head: Normocephalic  Mouth/Throat:      Mouth: Mucous membranes are moist    Eyes:      Extraocular Movements: Extraocular movements intact  Cardiovascular:      Rate and Rhythm: Normal rate and regular rhythm  Heart sounds: Normal heart sounds  Pulmonary:      Effort: Pulmonary effort is normal       Breath sounds: Normal breath sounds  Abdominal:      General: Abdomen is flat   Bowel sounds are normal  Palpations: Abdomen is soft  Tenderness: There is generalized abdominal tenderness  Genitourinary:     Prostate: Normal       Rectum: Normal  Guaiac result positive  No mass or tenderness  Skin:     General: Skin is warm and dry  Capillary Refill: Capillary refill takes less than 2 seconds  Neurological:      General: No focal deficit present  Mental Status: He is alert and oriented to person, place, and time     Psychiatric:         Mood and Affect: Mood normal          Behavior: Behavior normal          Vital Signs  ED Triage Vitals [04/29/22 1447]   Temperature Pulse Respirations Blood Pressure SpO2   98 4 °F (36 9 °C) 75 18 137/82 99 %      Temp Source Heart Rate Source Patient Position - Orthostatic VS BP Location FiO2 (%)   Tympanic Monitor Sitting Left arm --      Pain Score       8           Vitals:    04/29/22 1447 04/29/22 1700 04/29/22 1745   BP: 137/82     Pulse: 75 60 72   Patient Position - Orthostatic VS: Sitting           Visual Acuity      ED Medications  Medications   sodium chloride 0 9 % bolus 1,000 mL (1,000 mL Intravenous New Bag 4/29/22 1632)   iohexol (OMNIPAQUE) 350 MG/ML injection (SINGLE-DOSE) 100 mL (100 mL Intravenous Given 4/29/22 1717)       Diagnostic Studies  Results Reviewed     Procedure Component Value Units Date/Time    UA (URINE) with reflex to Scope [[id number]] Collected: 04/29/22 1755    Lab Status: Final result Specimen: Urine, Clean Catch Updated: 04/29/22 1803     Color, UA Colorless     Clarity, UA Clear     Specific Gravity, UA <=1 005     pH, UA 7 0     Leukocytes, UA Negative     Nitrite, UA Negative     Protein, UA Negative mg/dl      Glucose, UA Negative mg/dl      Ketones, UA Negative mg/dl      Urobilinogen, UA 0 2 E U /dl      Bilirubin, UA Negative     Blood, UA Negative    Comprehensive metabolic panel [[id number]]  (Abnormal) Collected: 04/29/22 1636    Lab Status: Final result Specimen: Blood from Arm, Right Updated: 04/29/22 1658     Sodium 141 mmol/L      Potassium 3 8 mmol/L      Chloride 105 mmol/L      CO2 28 mmol/L      ANION GAP 8 mmol/L      BUN 10 mg/dL      Creatinine 0 94 mg/dL      Glucose 91 mg/dL      Calcium 8 8 mg/dL      AST 20 U/L      ALT 33 U/L      Alkaline Phosphatase 86 U/L      Total Protein 8 0 g/dL      Albumin 4 5 g/dL      Total Bilirubin 1 38 mg/dL      eGFR 115 ml/min/1 73sq m     Narrative:      National Kidney Disease Foundation guidelines for Chronic Kidney Disease (CKD):     Stage 1 with normal or high GFR (GFR > 90 mL/min/1 73 square meters)    Stage 2 Mild CKD (GFR = 60-89 mL/min/1 73 square meters)    Stage 3A Moderate CKD (GFR = 45-59 mL/min/1 73 square meters)    Stage 3B Moderate CKD (GFR = 30-44 mL/min/1 73 square meters)    Stage 4 Severe CKD (GFR = 15-29 mL/min/1 73 square meters)    Stage 5 End Stage CKD (GFR <15 mL/min/1 73 square meters)  Note: GFR calculation is accurate only with a steady state creatinine    Lipase [[id number]]  (Normal) Collected: 04/29/22 1636    Lab Status: Final result Specimen: Blood from Arm, Right Updated: 04/29/22 1658     Lipase 227 u/L     Protime-INR [[id number]]  (Normal) Collected: 04/29/22 1636    Lab Status: Final result Specimen: Blood from Arm, Right Updated: 04/29/22 1655     Protime 13 8 seconds      INR 1 09    APTT [[id number]]  (Normal) Collected: 04/29/22 1636    Lab Status: Final result Specimen: Blood from Arm, Right Updated: 04/29/22 1655     PTT 28 seconds     CBC and differential [[id number]]  (Abnormal) Collected: 04/29/22 1636    Lab Status: Final result Specimen: Blood from Arm, Right Updated: 04/29/22 1642     WBC 7 54 Thousand/uL      RBC 5 94 Million/uL      Hemoglobin 16 7 g/dL      Hematocrit 49 4 %      MCV 83 fL      MCH 28 1 pg      MCHC 33 8 g/dL      RDW 12 7 %      MPV 8 7 fL      Platelets 812 Thousands/uL      nRBC 0 /100 WBCs      Neutrophils Relative 61 %      Immat GRANS % 0 %      Lymphocytes Relative 28 %      Monocytes Relative 10 % Eosinophils Relative 0 %      Basophils Relative 1 %      Neutrophils Absolute 4 59 Thousands/µL      Immature Grans Absolute 0 03 Thousand/uL      Lymphocytes Absolute 2 11 Thousands/µL      Monocytes Absolute 0 75 Thousand/µL      Eosinophils Absolute 0 02 Thousand/µL      Basophils Absolute 0 04 Thousands/µL                  CT abdomen pelvis with contrast   Final Result by Jose Guadalupe Askew MD (04/29 1749)      Unremarkable CT of the abdomen and pelvis  Workstation performed: [id number]         XR ankle 3+ views LEFT   Final Result by Gerhard Wilde MD (04/29 1649)      No acute osseous abnormality  Workstation performed: [id number]                    Procedures  Procedures         ED Course                                             MDM    Disposition  Final diagnoses:   Abdominal pain   Gastritis   Ankle pain     Time reflects when diagnosis was documented in both MDM as applicable and the Disposition within this note     Time User Action Codes Description Comment    4/29/2022  6:14 PM Katreginone Okeefe A Add [R10 9] Abdominal pain     4/29/2022  6:14 PM Sandy Erica Add [K29 70] Gastritis     4/29/2022  6:14 PM Casarez, [address] Ankle pain       ED Disposition     ED Disposition Condition Date/Time Comment    Discharge Stable Fri Apr 29, 2022  6:13 PM Adrian Nobles discharge to home/self care              Follow-up Information     Follow up With Specialties Details Why Ethan Stafford MD Family Medicine Schedule an appointment as soon as possible for a visit   [address] [facility]      Ralph Stringer MD Orthopedic Surgery Schedule an appointment as soon as possible for a visit in 1 day  [address]  [phone]            Patient's Medications   Discharge Prescriptions    PANTOPRAZOLE (PROTONIX) 20 MG TABLET    Take 1 tablet (20 mg total) by mouth daily       Start Date: 4/29/2022 End Date: --       Order Dose: 20 mg       Quantity: 30 tablet    Refills: 0       No discharge procedures on file      PDMP Review     None          ED Provider  Electronically Signed by           Ruthann Cogan, MD  04/29/22 7047

## 2022-05-04 ENCOUNTER — OFFICE VISIT (OUTPATIENT)
Dept: OBGYN CLINIC | Facility: CLINIC | Age: 22
End: 2022-05-04
Payer: MEDICAID

## 2022-05-04 VITALS
HEART RATE: 73 BPM | SYSTOLIC BLOOD PRESSURE: 112 MMHG | WEIGHT: 177 LBS | BODY MASS INDEX: 23.98 KG/M2 | HEIGHT: 72 IN | TEMPERATURE: 98.2 F | DIASTOLIC BLOOD PRESSURE: 71 MMHG | RESPIRATION RATE: 19 BRPM

## 2022-05-04 DIAGNOSIS — M19.172 POST-TRAUMATIC ARTHRITIS OF LEFT ANKLE: Primary | ICD-10-CM

## 2022-05-04 PROCEDURE — 99213 OFFICE O/P EST LOW 20 MIN: CPT | Performed by: PHYSICIAN ASSISTANT

## 2022-05-04 NOTE — LETTER
May 4, 2022     Patient: Marva Larsen  YOB: 2000  Date of Visit: 5/4/2022      To Whom it May Concern:    Marva Larsen is under my professional care  Lennie Romberg was seen in my office on 5/4/2022  He will remain out of work until 5/10/22  If you have any questions or concerns, please don't hesitate to call           Sincerely,          Phillip Yoon PA-C        CC: No Recipients

## 2022-05-04 NOTE — PROGRESS NOTES
Assessment/Plan:  1  Post-traumatic arthritis of left ankle       The patient continues to have intermittent flares of more significant discomfort about the ankle, as well as baseline pain  As previously advised by Dr Ny Carbajal, I again advised the patient he should see our foot and ankle specialist Dr Coral Dias  We will help him set up this appointment today  I will keep the patient out of work until he has this appointment  He can ice and take over-the-counter medications as needed for discomfort  Subjective:   Golden Bentley is a 25 y o  male who presents today for follow-up of his left ankle  He did undergo surgical fixation of this ankle back in 2017   He notes he has had constant baseline mild discomfort about the ankle since that time, but has intermittent Mitten flares of more significant pain  His most recent flare started about 2 weeks ago  He notes pain about the anterior ankle mostly  This is worse with really any activity and better with rest   He denies any significant swelling but notes some ecchymosis about the anterior ankle  He notes good sensation of the left lower extremity  Dr Ny Carbajal had seen the patient back in December of 2021 and had diagnosed him with some posttraumatic arthritis about the ankle  He had advised the patient see our foot and ankle specialist Dr Coral Dias  He had not advised hardware removal at that point  Review of Systems   Constitutional: Negative  Negative for chills and fever  HENT: Negative  Negative for ear pain and sore throat  Eyes: Negative  Negative for pain and redness  Respiratory: Negative  Negative for shortness of breath and wheezing  Cardiovascular: Negative for chest pain and palpitations  Gastrointestinal: Negative  Negative for abdominal pain and blood in stool  Endocrine: Negative  Negative for polydipsia and polyuria  Genitourinary: Negative  Negative for difficulty urinating and dysuria     Musculoskeletal: As noted in HPI   Skin: Negative  Negative for pallor and rash  Neurological: Negative  Negative for dizziness and numbness  Hematological: Negative  Negative for adenopathy  Does not bruise/bleed easily  Psychiatric/Behavioral: Negative  Negative for confusion and suicidal ideas  Past Medical History:   Diagnosis Date    Asthma     childhood    Constipation        Past Surgical History:   Procedure Laterality Date    FRACTURE SURGERY Left     tibia    ORIF TIBIA FRACTURE Left 3/23/2017    Procedure: OPEN REDUCTION W/ INTERNAL FIXATION (ORIF) DISTAL TIBIA  ARTICULAR  FRACTURE;  Surgeon: Espinoza Cabral MD;  Location: [place] [address] MAIN OR;  Service:        Family History   Problem Relation Age of Onset    No Known Problems Mother     No Known Problems Father     No Known Problems Sister        Social History     Occupational History    Not on file   Tobacco Use    Smoking status: Former Smoker     Types: E-Cigarettes    Smokeless tobacco: Never Used    Tobacco comment: vape - 20 x day   Vaping Use    Vaping Use: Former   Substance and Sexual Activity    Alcohol use: No    Drug use: Yes     Frequency: 1 0 times per week     Types: Marijuana     Comment: medical card    Sexual activity: Yes     Partners: Female     Birth control/protection: Condom Male         Current Outpatient Medications:     albuterol (PROVENTIL HFA,VENTOLIN HFA) 90 mcg/act inhaler, Inhale 2 puffs every 4 (four) hours as needed for wheezing (Patient not taking: Reported on 8/4/2021), Disp: 8 g, Rfl: 0    pantoprazole (PROTONIX) 20 mg tablet, Take 1 tablet (20 mg total) by mouth daily, Disp: 30 tablet, Rfl: 0    No Known Allergies    Objective:  Vitals:    05/04/22 0814   BP: 112/71   Pulse: 73   Resp: 19   Temp: 98 2 °F (36 8 °C)       Left Ankle Exam     Tenderness   Left ankle tenderness location: tibiotalar joint     Swelling: none    Range of Motion   Dorsiflexion: 5   Plantar flexion: 25     Muscle Strength Dorsiflexion:  5/5   Plantar flexion:  5/5     Other   Erythema: absent  Scars: present  Sensation: normal  Pulse: present    Comments:  Mild ecchymosis anterior ankle  Observations   Left Ankle/Foot   Positive for adhesive scar  Strength/Myotome Testing     Left Ankle/Foot   Dorsiflexion: 5  Plantar flexion: 5      Physical Exam  Constitutional:       General: He is not in acute distress  Appearance: He is well-developed  HENT:      Head: Normocephalic and atraumatic  Eyes:      General: No scleral icterus  Conjunctiva/sclera: Conjunctivae normal    Neck:      Vascular: No JVD  Cardiovascular:      Rate and Rhythm: Normal rate  Pulmonary:      Effort: Pulmonary effort is normal  No respiratory distress  Skin:     General: Skin is warm  Neurological:      Mental Status: He is alert and oriented to person, place, and time  Coordination: Coordination normal          I have personally reviewed pertinent films in PACS and my interpretation is as follows:  X-rays left ankle from 4/29/2022:  Surgical hardware in place  No signs of loosening  No acute osseous abnormality

## 2022-05-11 ENCOUNTER — TELEPHONE (OUTPATIENT)
Dept: OBGYN CLINIC | Facility: OTHER | Age: 22
End: 2022-05-11

## 2022-05-11 NOTE — TELEPHONE ENCOUNTER
Spoke with patient and advised of below, also told to check with [facility] and [address] [place] [address]

## 2022-05-11 NOTE — TELEPHONE ENCOUNTER
Patient called in , he cannot schedule with Dr Olya Phillips due to [address] [place] [address] Medicaid  He would like to know if you know any foot & ankle providers in [address] [place] [address] even out aside of [facility]      C/b # [phone]

## 2022-05-17 ENCOUNTER — OFFICE VISIT (OUTPATIENT)
Dept: FAMILY MEDICINE CLINIC | Facility: CLINIC | Age: 22
End: 2022-05-17
Payer: MEDICAID

## 2022-05-17 VITALS
TEMPERATURE: 98.1 F | SYSTOLIC BLOOD PRESSURE: 92 MMHG | HEART RATE: 80 BPM | DIASTOLIC BLOOD PRESSURE: 52 MMHG | OXYGEN SATURATION: 99 % | WEIGHT: 172 LBS | BODY MASS INDEX: 23.33 KG/M2 | RESPIRATION RATE: 18 BRPM

## 2022-05-17 DIAGNOSIS — Z02.9 ADMINISTRATIVE ENCOUNTER: Primary | ICD-10-CM

## 2022-05-17 DIAGNOSIS — M19.172 POST-TRAUMATIC ARTHRITIS OF LEFT ANKLE: ICD-10-CM

## 2022-05-17 PROCEDURE — 99213 OFFICE O/P EST LOW 20 MIN: CPT | Performed by: FAMILY MEDICINE

## 2022-05-17 NOTE — PROGRESS NOTES
Assessment/Plan:      Diagnoses and all orders for this visit:    Administrative encounter    Post-traumatic arthritis of left ankle          Patient here to have FMLA forms completed  Explained to patient I am not able to complete form in entirety given specifics of questions asked  Patient will need to take form back to foot and ankle specialists  Patient due for annual physical   Reached out to office staff to schedule at patient's earliest convenience  Subjective:     Patient ID: Abhinav Arredondo is a 25 y o  male  HPI   Glensharrimarcelo Stokes is here today requesting FMLA forms to be completed  Patient has recently seen and foot and ankle specialist in [place] saying he will need hardware removed from his left foot  States that pain has been acting up since his visit in the ED in late April  Review of Systems   Constitutional: Negative for chills and fever  HENT: Negative for sore throat  Respiratory: Negative for cough and shortness of breath  Cardiovascular: Negative for chest pain and palpitations  Gastrointestinal: Negative for abdominal pain, constipation, diarrhea, nausea and vomiting  Genitourinary: Negative for difficulty urinating and dysuria  Musculoskeletal: Positive for arthralgias (in left ankle)  Neurological: Negative for dizziness and headaches  Objective:  Vitals:    05/17/22 1443   BP: 92/52   Pulse: 80   Resp: 18   Temp: 98 1 °F (36 7 °C)   SpO2: 99%        Physical Exam  Constitutional:       Appearance: Normal appearance  HENT:      Head: Normocephalic and atraumatic  Cardiovascular:      Rate and Rhythm: Normal rate and regular rhythm  Heart sounds: Normal heart sounds  No murmur heard  Pulmonary:      Breath sounds: Normal breath sounds  No wheezing  Abdominal:      Palpations: Abdomen is soft  Tenderness: There is no abdominal tenderness  Musculoskeletal:      Right lower leg: No edema  Left lower leg: No edema     Neurological:      Mental Status: He is alert

## 2022-05-18 ENCOUNTER — TELEPHONE (OUTPATIENT)
Dept: FAMILY MEDICINE CLINIC | Facility: CLINIC | Age: 22
End: 2022-05-18

## 2022-05-18 NOTE — TELEPHONE ENCOUNTER
----- Message from Kaylin Roth MD sent at 5/17/2022  3:13 PM EDT -----  Regarding: Yearly Physical  Patient was seen in office today  Please schedule patient for annual physical at patient's earliest convenience  Thank you

## 2022-05-18 NOTE — TELEPHONE ENCOUNTER
Patient not due for a physical until 8/26/22   Told patient to call us back to schedule once we have schedule or we will be reaching out to patient to schedule during this time

## 2022-10-12 PROBLEM — K52.9 GASTROENTERITIS: Status: RESOLVED | Noted: 2021-08-04 | Resolved: 2022-10-12

## 2022-10-24 ENCOUNTER — OFFICE VISIT (OUTPATIENT)
Dept: FAMILY MEDICINE CLINIC | Facility: CLINIC | Age: 22
End: 2022-10-24

## 2022-10-24 VITALS
DIASTOLIC BLOOD PRESSURE: 60 MMHG | SYSTOLIC BLOOD PRESSURE: 102 MMHG | TEMPERATURE: 99.3 F | OXYGEN SATURATION: 98 % | WEIGHT: 156 LBS | BODY MASS INDEX: 21.16 KG/M2 | HEART RATE: 96 BPM

## 2022-10-24 DIAGNOSIS — Z11.59 ENCOUNTER FOR HEPATITIS C SCREENING TEST FOR LOW RISK PATIENT: ICD-10-CM

## 2022-10-24 DIAGNOSIS — Z23 ENCOUNTER FOR IMMUNIZATION: ICD-10-CM

## 2022-10-24 DIAGNOSIS — K92.1 BLOODY STOOL: Primary | ICD-10-CM

## 2022-10-24 DIAGNOSIS — Z11.4 ENCOUNTER FOR SCREENING FOR HIV: ICD-10-CM

## 2022-10-24 DIAGNOSIS — Z23 ENCOUNTER FOR ADMINISTRATION OF VACCINE: ICD-10-CM

## 2022-10-24 NOTE — PROGRESS NOTES
[facility] Office visit    Assessment/Plan:     1  Bloody stool  Comments:  Chronic abd pain and bloody stool concerning for inflam  bowl  Consider infection based on recent travel, but doesn't explain chronic symptoms  GI referal, labs  Orders:  -     Ambulatory Referral to Gastroenterology; Future  -     CBC and differential; Future  -     Comprehensive metabolic panel; Future  -     Ova and parasite examination; Future  -     Sedimentation rate, automated; Future  -     C-reactive protein; Future  -     Calprotectin,Fecal; Future  -     Giardia antigen; Future  -     Tissue Transglutaminase, IgG,IgA; Future  -     Stool culture; Future  -     Yersinia culture, stool; Future    2  Encounter for hepatitis C screening test for low risk patient  -     Hepatitis C antibody; Future    3  Encounter for screening for HIV  -     Human Immunodeficiency Virus 1/2 Antigen / Antibody ( Fourth Generation) with Reflex Testing; Future; Expected date: 10/25/2022    4  Encounter for administration of vaccine    5  Encounter for immunization  -     influenza vaccine, quadrivalent, 0 5 mL, preservative-free, for adult and pediatric patients 6 mos+ (AFLURIA, FLUARIX, FLULAVAL, FLUZONE)          No follow-ups on file  Subjective:   HPI  Alvaro Garibay is a 25 y o  male  Patient here for follow up for chronic abdominal pain and blood in his stool, ongoing for several years  Blood is mixed into the stool and in the bowel  He has never had a colonoscopy to evaluate for colitis  He swetha recently in [facility] one month ago and stayed in fairly remote areas  Review of Systems     Objective:     /60 (BP Location: Left arm, Patient Position: Sitting, Cuff Size: Adult)   Pulse 96   Temp 99 3 °F (37 4 °C) (Tympanic)   Wt 70 8 kg (156 lb)   SpO2 98%   BMI 21 16 kg/m²      Physical Exam  Vitals reviewed  Constitutional:       Appearance: Normal appearance  HENT:      Head: Normocephalic and atraumatic  Mouth/Throat:      Mouth: Mucous membranes are moist    Eyes:      Extraocular Movements: Extraocular movements intact  Pupils: Pupils are equal, round, and reactive to light  Cardiovascular:      Rate and Rhythm: Normal rate and regular rhythm  Pulses: Normal pulses  Heart sounds: Normal heart sounds  Pulmonary:      Effort: Pulmonary effort is normal       Breath sounds: Normal breath sounds  Abdominal:      General: Bowel sounds are normal       Palpations: Abdomen is soft  Tenderness: There is abdominal tenderness (RLQ, LLQ)  Skin:     General: Skin is warm  Capillary Refill: Capillary refill takes less than 2 seconds  Neurological:      General: No focal deficit present  Mental Status: He is alert     Psychiatric:         Mood and Affect: Mood normal          Behavior: Behavior normal           ** Please Note: This note has been constructed using a voice recognition system [employer]  10/24/22  2:25 PM

## 2022-10-25 ENCOUNTER — APPOINTMENT (OUTPATIENT)
Dept: LAB | Facility: HOSPITAL | Age: 22
End: 2022-10-25
Payer: MEDICAID

## 2022-10-25 DIAGNOSIS — K92.1 BLOODY STOOL: ICD-10-CM

## 2022-10-25 DIAGNOSIS — Z11.59 ENCOUNTER FOR HEPATITIS C SCREENING TEST FOR LOW RISK PATIENT: ICD-10-CM

## 2022-10-25 DIAGNOSIS — Z11.4 ENCOUNTER FOR SCREENING FOR HIV: ICD-10-CM

## 2022-10-25 LAB
ALBUMIN SERPL BCP-MCNC: 4.4 G/DL (ref 3.5–5)
ALP SERPL-CCNC: 71 U/L (ref 46–116)
ALT SERPL W P-5'-P-CCNC: 22 U/L (ref 12–78)
ANION GAP SERPL CALCULATED.3IONS-SCNC: 7 MMOL/L (ref 4–13)
AST SERPL W P-5'-P-CCNC: 13 U/L (ref 5–45)
BASOPHILS # BLD AUTO: 0.02 THOUSANDS/ÂΜL (ref 0–0.1)
BASOPHILS NFR BLD AUTO: 0 % (ref 0–1)
BILIRUB SERPL-MCNC: 1.98 MG/DL (ref 0.2–1)
BUN SERPL-MCNC: 10 MG/DL (ref 5–25)
CALCIUM SERPL-MCNC: 9 MG/DL (ref 8.3–10.1)
CHLORIDE SERPL-SCNC: 104 MMOL/L (ref 96–108)
CO2 SERPL-SCNC: 28 MMOL/L (ref 21–32)
CREAT SERPL-MCNC: 0.92 MG/DL (ref 0.6–1.3)
CRP SERPL QL: 0.6 MG/L
EOSINOPHIL # BLD AUTO: 0.06 THOUSAND/ÂΜL (ref 0–0.61)
EOSINOPHIL NFR BLD AUTO: 1 % (ref 0–6)
ERYTHROCYTE [DISTWIDTH] IN BLOOD BY AUTOMATED COUNT: 12.6 % (ref 11.6–15.1)
ERYTHROCYTE [SEDIMENTATION RATE] IN BLOOD: 3 MM/HOUR (ref 0–14)
GFR SERPL CREATININE-BSD FRML MDRD: 118 ML/MIN/1.73SQ M
GLUCOSE P FAST SERPL-MCNC: 76 MG/DL (ref 65–99)
HCT VFR BLD AUTO: 44.9 % (ref 36.5–49.3)
HCV AB SER QL: NORMAL
HGB BLD-MCNC: 15.1 G/DL (ref 12–17)
IMM GRANULOCYTES # BLD AUTO: 0.02 THOUSAND/UL (ref 0–0.2)
IMM GRANULOCYTES NFR BLD AUTO: 0 % (ref 0–2)
LYMPHOCYTES # BLD AUTO: 2.18 THOUSANDS/ÂΜL (ref 0.6–4.47)
LYMPHOCYTES NFR BLD AUTO: 45 % (ref 14–44)
MCH RBC QN AUTO: 27.9 PG (ref 26.8–34.3)
MCHC RBC AUTO-ENTMCNC: 33.6 G/DL (ref 31.4–37.4)
MCV RBC AUTO: 83 FL (ref 82–98)
MONOCYTES # BLD AUTO: 0.38 THOUSAND/ÂΜL (ref 0.17–1.22)
MONOCYTES NFR BLD AUTO: 8 % (ref 4–12)
NEUTROPHILS # BLD AUTO: 2.2 THOUSANDS/ÂΜL (ref 1.85–7.62)
NEUTS SEG NFR BLD AUTO: 46 % (ref 43–75)
NRBC BLD AUTO-RTO: 0 /100 WBCS
PLATELET # BLD AUTO: 295 THOUSANDS/UL (ref 149–390)
PMV BLD AUTO: 8.8 FL (ref 8.9–12.7)
POTASSIUM SERPL-SCNC: 3.9 MMOL/L (ref 3.5–5.3)
PROT SERPL-MCNC: 7.8 G/DL (ref 6.4–8.4)
RBC # BLD AUTO: 5.42 MILLION/UL (ref 3.88–5.62)
SODIUM SERPL-SCNC: 139 MMOL/L (ref 135–147)
WBC # BLD AUTO: 4.86 THOUSAND/UL (ref 4.31–10.16)

## 2022-10-25 PROCEDURE — 80053 COMPREHEN METABOLIC PANEL: CPT

## 2022-10-25 PROCEDURE — 87389 HIV-1 AG W/HIV-1&-2 AB AG IA: CPT

## 2022-10-25 PROCEDURE — 85025 COMPLETE CBC W/AUTO DIFF WBC: CPT

## 2022-10-25 PROCEDURE — 86803 HEPATITIS C AB TEST: CPT

## 2022-10-25 PROCEDURE — 85652 RBC SED RATE AUTOMATED: CPT

## 2022-10-25 PROCEDURE — 36415 COLL VENOUS BLD VENIPUNCTURE: CPT

## 2022-10-25 PROCEDURE — 86140 C-REACTIVE PROTEIN: CPT

## 2022-10-25 PROCEDURE — 83516 IMMUNOASSAY NONANTIBODY: CPT

## 2022-10-26 LAB
HIV 1+2 AB+HIV1 P24 AG SERPL QL IA: NORMAL
TTG IGA SER-ACNC: <2 U/ML (ref 0–3)
TTG IGG SER-ACNC: <2 U/ML (ref 0–5)

## 2022-10-27 ENCOUNTER — APPOINTMENT (OUTPATIENT)
Dept: LAB | Facility: HOSPITAL | Age: 22
End: 2022-10-27
Payer: MEDICAID

## 2022-10-27 DIAGNOSIS — K92.1 BLOOD IN STOOL: ICD-10-CM

## 2022-10-27 PROCEDURE — 83993 ASSAY FOR CALPROTECTIN FECAL: CPT

## 2022-10-27 PROCEDURE — 87209 SMEAR COMPLEX STAIN: CPT

## 2022-10-27 PROCEDURE — 87046 STOOL CULTR AEROBIC BACT EA: CPT

## 2022-10-27 PROCEDURE — 87329 GIARDIA AG IA: CPT

## 2022-10-27 PROCEDURE — 87177 OVA AND PARASITES SMEARS: CPT

## 2022-10-27 PROCEDURE — 87505 NFCT AGENT DETECTION GI: CPT

## 2022-10-28 LAB
CAMPYLOBACTER DNA SPEC NAA+PROBE: NORMAL
SALMONELLA DNA SPEC QL NAA+PROBE: NORMAL
SHIGA TOXIN STX GENE SPEC NAA+PROBE: NORMAL
SHIGELLA DNA SPEC QL NAA+PROBE: NORMAL

## 2022-10-29 LAB
G LAMBLIA AG STL QL IA: NEGATIVE
YERSINIA STL CULT: NORMAL

## 2022-10-31 LAB — CALPROTECTIN STL-MCNT: 23 UG/G (ref 0–120)

## 2022-11-10 ENCOUNTER — CONSULT (OUTPATIENT)
Dept: GASTROENTEROLOGY | Facility: CLINIC | Age: 22
End: 2022-11-10

## 2022-11-10 VITALS
TEMPERATURE: 97.9 F | HEIGHT: 72 IN | DIASTOLIC BLOOD PRESSURE: 66 MMHG | HEART RATE: 78 BPM | BODY MASS INDEX: 24.43 KG/M2 | WEIGHT: 180.4 LBS | SYSTOLIC BLOOD PRESSURE: 124 MMHG

## 2022-11-10 DIAGNOSIS — K92.1 BLOODY STOOL: Primary | ICD-10-CM

## 2022-11-10 DIAGNOSIS — R10.9 ABDOMINAL PAIN, UNSPECIFIED ABDOMINAL LOCATION: ICD-10-CM

## 2022-11-10 RX ORDER — DICYCLOMINE HCL 20 MG
20 TABLET ORAL 3 TIMES DAILY PRN
Qty: 15 TABLET | Refills: 0 | Status: SHIPPED | OUTPATIENT
Start: 2022-11-10

## 2022-11-10 NOTE — ASSESSMENT & PLAN NOTE
Nonspecific left-sided abdominal pain with all the workup including CT scan were negative  Appears to be most likely functional from IBS  Rule out peptic ulcer disease or H pylori gastritis  -explained to patient and his mother in detail about different possible etiologies and given reassurance  Explained about the pathophysiology of IBS  -will give a trial with Bentyl as needed  Discussed about the side effects     -schedule for upper endoscopy    -Patient was explained about the lifestyle and dietary modifications  Advised to avoid fatty foods, chocolates, caffeine, alcohol and any other triggering foods

## 2022-11-10 NOTE — ASSESSMENT & PLAN NOTE
Probable bleeding from hemorrhoids  Doubt for IBD    -Schedule for colonoscopy  -High-fiber diet     -Patient was given instructions about the colonoscopy prep     -Patient was explained about  the risks and benefits of the procedure  Risks including but not limited to bleeding, infection, perforation were explained in detail  Also explained about less than 100% sensitivity with the exam and other alternatives

## 2022-11-10 NOTE — PROGRESS NOTES
Consultation - [facility]  Cora Dear 2000 male         Chief Complaint:  Abdominal pain, rectal bleeding    HPI:  80-year-old male with history of depression in the past reports having chronic stomach issues for longtime  He came in along with his mother  Complaining about abdominal pain mostly left upper quadrant and paraumbilical area that gets worse at times as cramping  Complaining about nausea but denies any specific relationship with meal a  He has soft regular bowel movements reports seeing fresh bleeding  He was seen in the ER in April because of abdominal pain and rectal bleeding and had a CT scan done which was unremarkable and was told that there was no blood in the stool  Denies any NSAID use  Good appetite, no recent weight loss  He had stool testing including fecal calprotectin, all the lab workup including sed rate, CRP, tissue transglutaminase which were all negative  Chaperon: Ms Cadet Luigi: Review of Systems   Constitutional: Negative for activity change, appetite change, chills, diaphoresis, fatigue, fever and unexpected weight change  HENT: Negative for ear discharge, ear pain, facial swelling, hearing loss, nosebleeds, sore throat, tinnitus and voice change  Eyes: Negative for pain, discharge, redness, itching and visual disturbance  Respiratory: Negative for apnea, cough, chest tightness, shortness of breath and wheezing  Cardiovascular: Negative for chest pain and palpitations  Gastrointestinal:        As noted in HPI   Endocrine: Negative for cold intolerance, heat intolerance and polyuria  Genitourinary: Negative for difficulty urinating, dysuria, flank pain, hematuria and urgency  Musculoskeletal: Negative for arthralgias, back pain, gait problem, joint swelling and myalgias  Skin: Negative for rash and wound     Neurological: Negative for dizziness, tremors, seizures, speech difficulty, light-headedness, numbness and headaches  Hematological: Negative for adenopathy  Does not bruise/bleed easily  Psychiatric/Behavioral: Negative for agitation, behavioral problems and confusion  The patient is not nervous/anxious  Past Medical History:   Diagnosis Date   • Asthma     childhood   • Constipation       Past Surgical History:   Procedure Laterality Date   • FRACTURE SURGERY Left     tibia   • ORIF TIBIA FRACTURE Left 3/23/2017    Procedure: OPEN REDUCTION W/ INTERNAL FIXATION (ORIF) DISTAL TIBIA  ARTICULAR  FRACTURE;  Surgeon: Arvin Cardona MD;  Location: [place] [address] MAIN OR;  Service:      Social History     Socioeconomic History   • Marital status: Single     Spouse name: Not on file   • Number of children: Not on file   • Years of education: Not on file   • Highest education level: Some college, no degree   Occupational History   • Not on file   Tobacco Use   • Smoking status: Former Smoker     Types: E-Cigarettes   • Smokeless tobacco: Never Used   • Tobacco comment: vape - 20 x day   Vaping Use   • Vaping Use: Former   Substance and Sexual Activity   • Alcohol use: No   • Drug use: Not Currently     Frequency: 1 0 times per week     Types: Marijuana     Comment: medical card   • Sexual activity: Yes     Partners: Female     Birth control/protection: Condom Male   Other Topics Concern   • Not on file   Social History Narrative   • Not on file     Social Determinants of Health     Financial Resource Strain: Not on file   Food Insecurity: Not on file   Transportation Needs: Not on file   Physical Activity: Not on file   Stress: Not on file   Social Connections: Not on file   Intimate Partner Violence: Not on file   Housing Stability: Not on file     Family History   Problem Relation Age of Onset   • No Known Problems Mother    • No Known Problems Father    • No Known Problems Sister      Patient has no known allergies    Current Outpatient Medications   Medication Sig Dispense Refill   • bisacodyl (DULCOLAX) 5 mg EC tablet Take 2 tablets (10 mg total) by mouth once for 1 dose 2 tablet 0   • dicyclomine (BENTYL) 20 mg tablet Take 1 tablet (20 mg total) by mouth 3 (three) times a day as needed (Abdominal pain) 15 tablet 0   • polyethylene glycol (GOLYTELY) 4000 mL solution Take 4,000 mL by mouth once for 1 dose 4000 mL 0   • albuterol (PROVENTIL HFA,VENTOLIN HFA) 90 mcg/act inhaler Inhale 2 puffs every 4 (four) hours as needed for wheezing (Patient not taking: No sig reported) 8 g 0   • pantoprazole (PROTONIX) 20 mg tablet Take 1 tablet (20 mg total) by mouth daily (Patient not taking: No sig reported) 30 tablet 0     No current facility-administered medications for this visit  Blood pressure 124/66, pulse 78, temperature 97 9 °F (36 6 °C), temperature source Temporal, height 6' (1 829 m), weight 81 8 kg (180 lb 6 4 oz)  PHYSICAL EXAM: Physical Exam  Constitutional:       Appearance: He is well-developed  HENT:      Head: Normocephalic and atraumatic  Eyes:      General: No scleral icterus  Right eye: No discharge  Left eye: No discharge  Conjunctiva/sclera: Conjunctivae normal       Pupils: Pupils are equal, round, and reactive to light  Neck:      Thyroid: No thyromegaly  Vascular: No JVD  Trachea: No tracheal deviation  Cardiovascular:      Rate and Rhythm: Normal rate and regular rhythm  Heart sounds: Normal heart sounds  No murmur heard  No friction rub  No gallop  Pulmonary:      Effort: Pulmonary effort is normal  No respiratory distress  Breath sounds: Normal breath sounds  No wheezing or rales  Chest:      Chest wall: No tenderness  Abdominal:      General: Bowel sounds are normal  There is no distension  Palpations: Abdomen is soft  There is no mass  Tenderness: There is no abdominal tenderness  There is no guarding or rebound  Hernia: No hernia is present  Musculoskeletal:      Cervical back: Neck supple     Lymphadenopathy: Cervical: No cervical adenopathy  Skin:     General: Skin is warm and dry  Findings: No erythema or rash  Neurological:      Mental Status: He is alert and oriented to person, place, and time  Psychiatric:         Behavior: Behavior normal          Thought Content: Thought content normal           Lab Results   Component Value Date    WBC 4 86 10/25/2022    HGB 15 1 10/25/2022    HCT 44 9 10/25/2022    MCV 83 10/25/2022     10/25/2022     Lab Results   Component Value Date    CALCIUM 9 0 10/25/2022    K 3 9 10/25/2022    CO2 28 10/25/2022     10/25/2022    BUN 10 10/25/2022    CREATININE 0 92 10/25/2022     Lab Results   Component Value Date    ALT 22 10/25/2022    AST 13 10/25/2022    ALKPHOS 71 10/25/2022     Lab Results   Component Value Date    INR 1 09 04/29/2022    PROTIME 13 8 04/29/2022       XR ankle 3+ views LEFT    Result Date: 4/29/2022  Impression: No acute osseous abnormality  Workstation performed: [id number]     CT abdomen pelvis with contrast    Result Date: 4/29/2022  Impression: Unremarkable CT of the abdomen and pelvis  Workstation performed: [id number]       ASSESSMENT & PLAN:    Abdominal pain  Nonspecific left-sided abdominal pain with all the workup including CT scan were negative  Appears to be most likely functional from IBS  Rule out peptic ulcer disease or H pylori gastritis  -explained to patient and his mother in detail about different possible etiologies and given reassurance  Explained about the pathophysiology of IBS  -will give a trial with Bentyl as needed  Discussed about the side effects     -schedule for upper endoscopy    -Patient was explained about the lifestyle and dietary modifications  Advised to avoid fatty foods, chocolates, caffeine, alcohol and any other triggering foods  Bloody stool  Probable bleeding from hemorrhoids  Doubt for IBD    -Schedule for colonoscopy      -High-fiber diet     -Patient was given instructions about the colonoscopy prep     -Patient was explained about  the risks and benefits of the procedure  Risks including but not limited to bleeding, infection, perforation were explained in detail  Also explained about less than 100% sensitivity with the exam and other alternatives

## 2022-11-10 NOTE — H&P (VIEW-ONLY)
Consultation - [facility]  Bing Sarah 2000 male         Chief Complaint:  Abdominal pain, rectal bleeding    HPI:  62-year-old male with history of depression in the past reports having chronic stomach issues for longtime  He came in along with his mother  Complaining about abdominal pain mostly left upper quadrant and paraumbilical area that gets worse at times as cramping  Complaining about nausea but denies any specific relationship with meal a  He has soft regular bowel movements reports seeing fresh bleeding  He was seen in the ER in April because of abdominal pain and rectal bleeding and had a CT scan done which was unremarkable and was told that there was no blood in the stool  Denies any NSAID use  Good appetite, no recent weight loss  He had stool testing including fecal calprotectin, all the lab workup including sed rate, CRP, tissue transglutaminase which were all negative  Chaperon: Ms Rhianna Angeles: Review of Systems   Constitutional: Negative for activity change, appetite change, chills, diaphoresis, fatigue, fever and unexpected weight change  HENT: Negative for ear discharge, ear pain, facial swelling, hearing loss, nosebleeds, sore throat, tinnitus and voice change  Eyes: Negative for pain, discharge, redness, itching and visual disturbance  Respiratory: Negative for apnea, cough, chest tightness, shortness of breath and wheezing  Cardiovascular: Negative for chest pain and palpitations  Gastrointestinal:        As noted in HPI   Endocrine: Negative for cold intolerance, heat intolerance and polyuria  Genitourinary: Negative for difficulty urinating, dysuria, flank pain, hematuria and urgency  Musculoskeletal: Negative for arthralgias, back pain, gait problem, joint swelling and myalgias  Skin: Negative for rash and wound     Neurological: Negative for dizziness, tremors, seizures, speech difficulty, light-headedness, numbness and headaches  Hematological: Negative for adenopathy  Does not bruise/bleed easily  Psychiatric/Behavioral: Negative for agitation, behavioral problems and confusion  The patient is not nervous/anxious  Past Medical History:   Diagnosis Date   • Asthma     childhood   • Constipation       Past Surgical History:   Procedure Laterality Date   • FRACTURE SURGERY Left     tibia   • ORIF TIBIA FRACTURE Left 3/23/2017    Procedure: OPEN REDUCTION W/ INTERNAL FIXATION (ORIF) DISTAL TIBIA  ARTICULAR  FRACTURE;  Surgeon: Tom Nelson MD;  Location: [facility] MAIN OR;  Service:      Social History     Socioeconomic History   • Marital status: Single     Spouse name: Not on file   • Number of children: Not on file   • Years of education: Not on file   • Highest education level: Some college, no degree   Occupational History   • Not on file   Tobacco Use   • Smoking status: Former Smoker     Types: E-Cigarettes   • Smokeless tobacco: Never Used   • Tobacco comment: vape - 20 x day   Vaping Use   • Vaping Use: Former   Substance and Sexual Activity   • Alcohol use: No   • Drug use: Not Currently     Frequency: 1 0 times per week     Types: Marijuana     Comment: medical card   • Sexual activity: Yes     Partners: Female     Birth control/protection: Condom Male   Other Topics Concern   • Not on file   Social History Narrative   • Not on file     Social Determinants of Health     Financial Resource Strain: Not on file   Food Insecurity: Not on file   Transportation Needs: Not on file   Physical Activity: Not on file   Stress: Not on file   Social Connections: Not on file   Intimate Partner Violence: Not on file   Housing Stability: Not on file     Family History   Problem Relation Age of Onset   • No Known Problems Mother    • No Known Problems Father    • No Known Problems Sister      Patient has no known allergies    Current Outpatient Medications   Medication Sig Dispense Refill   • bisacodyl (DULCOLAX) 5 mg EC tablet Take 2 tablets (10 mg total) by mouth once for 1 dose 2 tablet 0   • dicyclomine (BENTYL) 20 mg tablet Take 1 tablet (20 mg total) by mouth 3 (three) times a day as needed (Abdominal pain) 15 tablet 0   • polyethylene glycol (GOLYTELY) 4000 mL solution Take 4,000 mL by mouth once for 1 dose 4000 mL 0   • albuterol (PROVENTIL HFA,VENTOLIN HFA) 90 mcg/act inhaler Inhale 2 puffs every 4 (four) hours as needed for wheezing (Patient not taking: No sig reported) 8 g 0   • pantoprazole (PROTONIX) 20 mg tablet Take 1 tablet (20 mg total) by mouth daily (Patient not taking: No sig reported) 30 tablet 0     No current facility-administered medications for this visit  Blood pressure 124/66, pulse 78, temperature 97 9 °F (36 6 °C), temperature source Temporal, height 6' (1 829 m), weight 81 8 kg (180 lb 6 4 oz)  PHYSICAL EXAM: Physical Exam  Constitutional:       Appearance: He is well-developed  HENT:      Head: Normocephalic and atraumatic  Eyes:      General: No scleral icterus  Right eye: No discharge  Left eye: No discharge  Conjunctiva/sclera: Conjunctivae normal       Pupils: Pupils are equal, round, and reactive to light  Neck:      Thyroid: No thyromegaly  Vascular: No JVD  Trachea: No tracheal deviation  Cardiovascular:      Rate and Rhythm: Normal rate and regular rhythm  Heart sounds: Normal heart sounds  No murmur heard  No friction rub  No gallop  Pulmonary:      Effort: Pulmonary effort is normal  No respiratory distress  Breath sounds: Normal breath sounds  No wheezing or rales  Chest:      Chest wall: No tenderness  Abdominal:      General: Bowel sounds are normal  There is no distension  Palpations: Abdomen is soft  There is no mass  Tenderness: There is no abdominal tenderness  There is no guarding or rebound  Hernia: No hernia is present  Musculoskeletal:      Cervical back: Neck supple     Lymphadenopathy: Cervical: No cervical adenopathy  Skin:     General: Skin is warm and dry  Findings: No erythema or rash  Neurological:      Mental Status: He is alert and oriented to person, place, and time  Psychiatric:         Behavior: Behavior normal          Thought Content: Thought content normal           Lab Results   Component Value Date    WBC 4 86 10/25/2022    HGB 15 1 10/25/2022    HCT 44 9 10/25/2022    MCV 83 10/25/2022     10/25/2022     Lab Results   Component Value Date    CALCIUM 9 0 10/25/2022    K 3 9 10/25/2022    CO2 28 10/25/2022     10/25/2022    BUN 10 10/25/2022    CREATININE 0 92 10/25/2022     Lab Results   Component Value Date    ALT 22 10/25/2022    AST 13 10/25/2022    ALKPHOS 71 10/25/2022     Lab Results   Component Value Date    INR 1 09 04/29/2022    PROTIME 13 8 04/29/2022       XR ankle 3+ views LEFT    Result Date: 4/29/2022  Impression: No acute osseous abnormality  Workstation performed: [id number]     CT abdomen pelvis with contrast    Result Date: 4/29/2022  Impression: Unremarkable CT of the abdomen and pelvis  Workstation performed: [id number]       ASSESSMENT & PLAN:    Abdominal pain  Nonspecific left-sided abdominal pain with all the workup including CT scan were negative  Appears to be most likely functional from IBS  Rule out peptic ulcer disease or H pylori gastritis  -explained to patient and his mother in detail about different possible etiologies and given reassurance  Explained about the pathophysiology of IBS  -will give a trial with Bentyl as needed  Discussed about the side effects     -schedule for upper endoscopy    -Patient was explained about the lifestyle and dietary modifications  Advised to avoid fatty foods, chocolates, caffeine, alcohol and any other triggering foods  Bloody stool  Probable bleeding from hemorrhoids  Doubt for IBD    -Schedule for colonoscopy      -High-fiber diet     -Patient was given instructions about the colonoscopy prep     -Patient was explained about  the risks and benefits of the procedure  Risks including but not limited to bleeding, infection, perforation were explained in detail  Also explained about less than 100% sensitivity with the exam and other alternatives

## 2022-11-10 NOTE — PATIENT INSTRUCTIONS
Scheduled date of EGD/colonoscopy (as of today): 11/25/22  Physician performing EGD/colonoscopy: Dr Wilson Haynes  Location of EGD/colonoscopy: [facility]  Desired bowel prep reviewed with patient: Golytely  Instructions reviewed with patient by: Sherlyn Mcbride  Clearances: Vaccinated

## 2022-11-25 ENCOUNTER — HOSPITAL ENCOUNTER (OUTPATIENT)
Dept: GASTROENTEROLOGY | Facility: AMBULARY SURGERY CENTER | Age: 22
Setting detail: OUTPATIENT SURGERY
End: 2022-11-25
Attending: INTERNAL MEDICINE

## 2022-11-25 ENCOUNTER — ANESTHESIA (OUTPATIENT)
Dept: GASTROENTEROLOGY | Facility: AMBULARY SURGERY CENTER | Age: 22
End: 2022-11-25

## 2022-11-25 ENCOUNTER — ANESTHESIA EVENT (OUTPATIENT)
Dept: GASTROENTEROLOGY | Facility: AMBULARY SURGERY CENTER | Age: 22
End: 2022-11-25

## 2022-11-25 VITALS
WEIGHT: 170 LBS | DIASTOLIC BLOOD PRESSURE: 61 MMHG | OXYGEN SATURATION: 100 % | HEART RATE: 71 BPM | TEMPERATURE: 97.9 F | BODY MASS INDEX: 23.06 KG/M2 | SYSTOLIC BLOOD PRESSURE: 104 MMHG | RESPIRATION RATE: 20 BRPM

## 2022-11-25 DIAGNOSIS — R10.9 ABDOMINAL PAIN, UNSPECIFIED ABDOMINAL LOCATION: ICD-10-CM

## 2022-11-25 DIAGNOSIS — K92.1 BLOODY STOOL: ICD-10-CM

## 2022-11-25 RX ORDER — SODIUM CHLORIDE, SODIUM LACTATE, POTASSIUM CHLORIDE, CALCIUM CHLORIDE 600; 310; 30; 20 MG/100ML; MG/100ML; MG/100ML; MG/100ML
INJECTION, SOLUTION INTRAVENOUS CONTINUOUS PRN
Status: DISCONTINUED | OUTPATIENT
Start: 2022-11-25 | End: 2022-11-25

## 2022-11-25 RX ORDER — PROPOFOL 10 MG/ML
INJECTION, EMULSION INTRAVENOUS AS NEEDED
Status: DISCONTINUED | OUTPATIENT
Start: 2022-11-25 | End: 2022-11-25

## 2022-11-25 RX ORDER — LIDOCAINE HYDROCHLORIDE 10 MG/ML
INJECTION, SOLUTION EPIDURAL; INFILTRATION; INTRACAUDAL; PERINEURAL AS NEEDED
Status: DISCONTINUED | OUTPATIENT
Start: 2022-11-25 | End: 2022-11-25

## 2022-11-25 RX ORDER — PROPOFOL 10 MG/ML
INJECTION, EMULSION INTRAVENOUS CONTINUOUS PRN
Status: DISCONTINUED | OUTPATIENT
Start: 2022-11-25 | End: 2022-11-25

## 2022-11-25 RX ADMIN — SODIUM CHLORIDE, SODIUM LACTATE, POTASSIUM CHLORIDE, AND CALCIUM CHLORIDE: .6; .31; .03; .02 INJECTION, SOLUTION INTRAVENOUS at 09:39

## 2022-11-25 RX ADMIN — PROPOFOL 140 MCG/KG/MIN: 10 INJECTION, EMULSION INTRAVENOUS at 10:00

## 2022-11-25 RX ADMIN — PROPOFOL 100 MG: 10 INJECTION, EMULSION INTRAVENOUS at 09:57

## 2022-11-25 RX ADMIN — LIDOCAINE HYDROCHLORIDE 50 MG: 10 INJECTION, SOLUTION EPIDURAL; INFILTRATION; INTRACAUDAL; PERINEURAL at 09:56

## 2022-11-25 RX ADMIN — PROPOFOL 200 MG: 10 INJECTION, EMULSION INTRAVENOUS at 09:56

## 2022-11-25 NOTE — ANESTHESIA PREPROCEDURE EVALUATION
Procedure:  COLONOSCOPY  EGD    Relevant Problems   No relevant active problems        Physical Exam    Airway    Mallampati score: II  TM Distance: <3 FB  Neck ROM: limited     Dental   No notable dental hx     Cardiovascular      Pulmonary      Other Findings        Anesthesia Plan  ASA Score- 2     Anesthesia Type- IV sedation with anesthesia with ASA Monitors  Additional Monitors:   Airway Plan:           Plan Factors-Exercise tolerance (METS): >4 METS  Chart reviewed  Existing labs reviewed  Patient summary reviewed  Patient is not a current smoker  Induction- intravenous  Postoperative Plan-     Informed Consent- Anesthetic plan and risks discussed with patient  I personally reviewed this patient with the CRNA  Discussed and agreed on the Anesthesia Plan with the CRNA  Zeina Patel

## 2022-11-25 NOTE — INTERVAL H&P NOTE
H&P reviewed  After examining the patient I find no changes in the patients condition since the H&P had been written      Vitals:    11/25/22 0858   BP: 110/56   Pulse: 75   Resp: 16   Temp: 97 9 °F (36 6 °C)   SpO2: 98%

## 2022-11-25 NOTE — ANESTHESIA POSTPROCEDURE EVALUATION
Post-Op Assessment Note    CV Status:  Stable  Pain Score: 0    Pain management: adequate     Mental Status:  Sleepy   Hydration Status:  Stable   PONV Controlled:  None   Airway Patency:  Patent   Two or more mitigation strategies used for obstructive sleep apnea   Post Op Vitals Reviewed: Yes      Staff: CRNA         No notable events documented      BP   100/56   Temp      Pulse 75   Resp 16   SpO2 99

## 2023-04-14 PROBLEM — S82.302A CLOSED FRACTURE OF LEFT DISTAL TIBIA: Status: RESOLVED | Noted: 2017-03-15 | Resolved: 2023-04-14

## 2023-04-14 PROBLEM — R10.9 ABDOMINAL PAIN: Status: RESOLVED | Noted: 2022-11-10 | Resolved: 2023-04-14

## 2023-04-14 PROBLEM — S82.302A CLOSED FRACTURE OF LEFT DISTAL TIBIA: Status: ACTIVE | Noted: 2017-03-15

## 2023-04-14 PROBLEM — K92.1 BLOODY STOOL: Status: RESOLVED | Noted: 2022-11-10 | Resolved: 2023-04-14

## 2023-04-16 PROBLEM — Z23: Status: ACTIVE | Noted: 2023-04-16

## 2025-02-07 ENCOUNTER — TELEPHONE (OUTPATIENT)
Age: 25
End: 2025-02-07

## 2025-02-14 ENCOUNTER — OFFICE VISIT (OUTPATIENT)
Age: 25
End: 2025-02-14

## 2025-02-14 VITALS
DIASTOLIC BLOOD PRESSURE: 62 MMHG | HEIGHT: 72 IN | WEIGHT: 193 LBS | SYSTOLIC BLOOD PRESSURE: 125 MMHG | HEART RATE: 57 BPM | RESPIRATION RATE: 18 BRPM | BODY MASS INDEX: 26.14 KG/M2 | OXYGEN SATURATION: 98 %

## 2025-02-14 DIAGNOSIS — Z00.00 ANNUAL PHYSICAL EXAM: Primary | ICD-10-CM

## 2025-02-14 DIAGNOSIS — Z23 ENCOUNTER FOR IMMUNIZATION: ICD-10-CM

## 2025-02-14 PROCEDURE — 99385 PREV VISIT NEW AGE 18-39: CPT | Performed by: FAMILY MEDICINE

## 2025-02-14 PROCEDURE — 90656 IIV3 VACC NO PRSV 0.5 ML IM: CPT | Performed by: FAMILY MEDICINE

## 2025-02-14 PROCEDURE — 90471 IMMUNIZATION ADMIN: CPT | Performed by: FAMILY MEDICINE

## 2025-02-14 PROCEDURE — 90715 TDAP VACCINE 7 YRS/> IM: CPT | Performed by: FAMILY MEDICINE

## 2025-02-14 PROCEDURE — 90472 IMMUNIZATION ADMIN EACH ADD: CPT | Performed by: FAMILY MEDICINE

## 2025-02-14 NOTE — PROGRESS NOTES
Adult Annual Physical  Name: Milton Souza      : 2000      MRN: [id number]  Encounter Provider: Aleks Weston DO  Encounter Date: 2025   Encounter department: [facility]    Assessment & Plan  Annual physical exam  Discussed age appropriate screenings and labs. Used to have asthma as a child but currently has no symptoms and does not need medications. Noted to have patches of dry skin on forearms and counseled on using daily moisturizer and skin care.     Discussed previous episode of muffled hearing on L side. 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 He followed up with ENT multiple times and was told there were no concerns at this point in time. Will continue to monitor and follow up as needed.    Orders:    Lipid Panel with Direct LDL reflex; Future    Encounter for immunization    Orders:    influenza vaccine preservative-free 0.5 mL IM (Fluzone, Afluria, Fluarix, Flulaval)    TDAP VACCINE GREATER THAN OR EQUAL TO 8YO IM      Immunizations and preventive care screenings were discussed with patient today. Appropriate education was printed on patient's after visit summary.    Counseling:  Alcohol/drug use: discussed moderation in alcohol intake, the recommendations for healthy alcohol use, and avoidance of illicit drug use.  Dental Health: discussed importance of regular tooth brushing, flossing, and dental visits.  Injury prevention: discussed safety/seat belts, safety helmets, smoke detectors, carbon monoxide detectors, and smoking near bedding or upholstery.  Exercise: the importance of regular exercise/physical activity was discussed. Recommend exercise 3-5 times per week for at least 30 minutes.   BMI Counseling: Body mass index is 26.18 kg/m². The BMI is above normal. Nutrition recommendations include decreasing fast food intake, consuming healthier snacks, limiting drinks that contain  sugar, moderation in carbohydrate intake, increasing intake of lean protein, reducing intake of saturated and trans fat and reducing intake of cholesterol. Exercise recommendations include exercising 3-5 times per week and strength training exercises. Rationale for BMI follow-up plan is due to patient being overweight or obese.     Depression Screening and Follow-up Plan: Patient was screened for depression during today's encounter. They screened negative with a PHQ-2 score of 0.        History of Present Illness     Adult Annual Physical:  Patient presents for annual physical. Patient is a 24 yo M with no significant pmhx presenting for an annual physical. He has no questions or concerns at this time..     Diet and Physical Activity:  - Diet/Nutrition: well balanced diet, consuming 3-5 servings of fruits/vegetables daily, adequate fiber intake, adequate whole grain intake and limited junk food.  - Exercise: no formal exercise and walking.    Depression Screening:  - PHQ-2 Score: 0    General Health:  - Sleep: sleeps well and 4-6 hours of sleep on average. musician  - Hearing: normal hearing right ear and decreased hearing left ear. 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 used to wear glases but now 20/20  - Dental: regular dental visits and brushes teeth twice daily.    /GYN Health:    - History of STDs: no     Health:  - History of STDs: no.     Review of Systems   Constitutional:  Negative for chills, fatigue and fever.   HENT:  Negative for hearing loss and sore throat.    Eyes:  Negative for visual disturbance.   Respiratory:  Negative for cough and shortness of breath.    Cardiovascular:  Negative for chest pain, palpitations and leg swelling.   Gastrointestinal:  Negative for abdominal pain, constipation, diarrhea, nausea and vomiting.   Genitourinary:  Negative for dysuria, frequency, hematuria and urgency.    Musculoskeletal:  Negative for arthralgias and back pain.   Skin:  Negative for rash and wound.        Dry skin on forearms.   Neurological:  Negative for dizziness, weakness, light-headedness and headaches.   Hematological:  Does not bruise/bleed easily.     Medical History Reviewed by provider this encounter:  Tobacco  Allergies  Meds  Problems  Med Hx  Surg Hx  Fam Hx     .  Past Medical History   Past Medical History:   Diagnosis Date    Abdominal pain     Abdominal pain 11/10/2022    Asthma     childhood    Blood in the stool     Bloody stool 11/10/2022    Chronic abd pain and bloody stool concerning for inflam. bowl. Consider infection based on recent travel, but doesn't explain chronic symptoms. GI referal, labs    Closed fracture of left distal tibia 3/15/2017    Constipation      Past Surgical History:   Procedure Laterality Date    FRACTURE SURGERY Left     tibia    ORIF TIBIA FRACTURE Left 3/23/2017    Procedure: OPEN REDUCTION W/ INTERNAL FIXATION (ORIF) DISTAL TIBIA  ARTICULAR  FRACTURE;  Surgeon: Hebert Dumont MD;  Location: [facility] MAIN OR;  Service:      Family History   Problem Relation Age of Onset    No Known Problems Mother     No Known Problems Father     No Known Problems Sister      Patient reports that he has quit smoking. He has never used smokeless tobacco. He reports that he does not currently use drugs after having used the following drugs: Marijuana. Frequency: 1.00 time per week. He reports that he does not drink alcohol.    Current Outpatient Medications on File Prior to Visit   Medication Sig Dispense Refill    [DISCONTINUED] bisacodyl (DULCOLAX) 5 mg EC tablet Take 2 tablets (10 mg total) by mouth once for 1 dose 2 tablet 0    [DISCONTINUED] dicyclomine (BENTYL) 20 mg tablet Take 1 tablet (20 mg total) by mouth 3 (three) times a day as needed (Abdominal pain) (Patient not taking: Reported on 2/14/2025) 15 tablet 0    [DISCONTINUED] polyethylene glycol (GOLYTELY) 4000 mL  solution Take 4,000 mL by mouth once for 1 dose 4000 mL 0     No current facility-administered medications on file prior to visit.   No Known Allergies   Current Outpatient Medications on File Prior to Visit   Medication Sig Dispense Refill    [DISCONTINUED] bisacodyl (DULCOLAX) 5 mg EC tablet Take 2 tablets (10 mg total) by mouth once for 1 dose 2 tablet 0    [DISCONTINUED] dicyclomine (BENTYL) 20 mg tablet Take 1 tablet (20 mg total) by mouth 3 (three) times a day as needed (Abdominal pain) (Patient not taking: Reported on 2/14/2025) 15 tablet 0    [DISCONTINUED] polyethylene glycol (GOLYTELY) 4000 mL solution Take 4,000 mL by mouth once for 1 dose 4000 mL 0     No current facility-administered medications on file prior to visit.      Social History     Tobacco Use    Smoking status: Former    Smokeless tobacco: Never   Vaping Use    Vaping status: Former   Substance and Sexual Activity    Alcohol use: No    Drug use: Not Currently     Frequency: 1.0 times per week     Types: Marijuana    Sexual activity: Yes     Partners: Female     Birth control/protection: Condom Male       Objective   /62 (BP Location: Left arm, Patient Position: Sitting)   Pulse 57   Resp 18   Ht 6' (1.829 m)   Wt 87.5 kg (193 lb)   SpO2 98%   BMI 26.18 kg/m²     Physical Exam  Vitals and nursing note reviewed.   Constitutional:       General: He is not in acute distress.     Appearance: Normal appearance.   HENT:      Head: Normocephalic and atraumatic.      Right Ear: External ear normal.      Left Ear: External ear normal.      Nose: Nose normal.      Mouth/Throat:      Mouth: Mucous membranes are moist.      Pharynx: Oropharynx is clear.   Eyes:      Extraocular Movements: Extraocular movements intact.   Cardiovascular:      Rate and Rhythm: Normal rate and regular rhythm.      Pulses: Normal pulses.      Heart sounds: Normal heart sounds.   Pulmonary:      Effort: Pulmonary effort is normal.      Breath sounds: Normal breath  sounds.   Chest:      Chest wall: No tenderness.   Abdominal:      General: Abdomen is flat. Bowel sounds are normal. There is no distension.      Palpations: Abdomen is soft.      Tenderness: There is no abdominal tenderness. There is no right CVA tenderness, left CVA tenderness or guarding.   Musculoskeletal:         General: No tenderness. Normal range of motion.      Cervical back: Normal range of motion.      Right lower leg: No edema.      Left lower leg: No edema.   Skin:     General: Skin is warm and dry.      Capillary Refill: Capillary refill takes less than 2 seconds.      Comments: Patches of dry skin on forearms.   Neurological:      General: No focal deficit present.      Mental Status: He is alert and oriented to person, place, and time.   Psychiatric:         Mood and Affect: Mood normal.         Behavior: Behavior normal.

## 2025-02-14 NOTE — PATIENT INSTRUCTIONS
"Patient Education     Routine physical for adults   The Basics   Written by the doctors and editors at [facility]   What is a physical? -- A physical is a routine visit, or \"check-up,\" with your doctor. You might also hear it called a \"wellness visit\" or \"preventive visit.\"  During each visit, the doctor will:   Ask about your physical and mental health   Ask about your habits, behaviors, and lifestyle   Do an exam   Give you vaccines if needed   Talk to you about any medicines you take   Give advice about your health   Answer your questions  Getting regular check-ups is an important part of taking care of your health. It can help your doctor find and treat any problems you have. But it's also important for preventing health problems.  A routine physical is different from a \"sick visit.\" A sick visit is when you see a doctor because of a health concern or problem. Since physicals are scheduled ahead of time, you can think about what you want to ask the doctor.  How often should I get a physical? -- It depends on your age and health. In general, for people age 21 years and older:   If you are younger than 50 years, you might be able to get a physical every 3 years.   If you are 50 years or older, your doctor might recommend a physical every year.  If you have an ongoing health condition, like diabetes or high blood pressure, your doctor will probably want to see you more often.  What happens during a physical? -- In general, each visit will include:   Physical exam - The doctor or nurse will check your height, weight, heart rate, and blood pressure. They will also look at your eyes and ears. They will ask about how you are feeling and whether you have any symptoms that bother you.   Medicines - It's a good idea to bring a list of all the medicines you take to each doctor visit. Your doctor will talk to you about your medicines and answer any questions. Tell them if you are having any side effects that bother you. You " "should also tell them if you are having trouble paying for any of your medicines.   Habits and behaviors - This includes:   Your diet   Your exercise habits   Whether you smoke, drink alcohol, or use drugs   Whether you are sexually active   Whether you feel safe at home  Your doctor will talk to you about things you can do to improve your health and lower your risk of health problems. They will also offer help and support. For example, if you want to quit smoking, they can give you advice and might prescribe medicines. If you want to improve your diet or get more physical activity, they can help you with this, too.   Lab tests, if needed - The tests you get will depend on your age and situation. For example, your doctor might want to check your:   Cholesterol   Blood sugar   Iron level   Vaccines - The recommended vaccines will depend on your age, health, and what vaccines you already had. Vaccines are very important because they can prevent certain serious or deadly infections.   Discussion of screening - \"Screening\" means checking for diseases or other health problems before they cause symptoms. Your doctor can recommend screening based on your age, risk, and preferences. This might include tests to check for:   Cancer, such as breast, prostate, cervical, ovarian, colorectal, prostate, lung, or skin cancer   Sexually transmitted infections, such as chlamydia and gonorrhea   Mental health conditions like depression and anxiety  Your doctor will talk to you about the different types of screening tests. They can help you decide which screenings to have. They can also explain what the results might mean.   Answering questions - The physical is a good time to ask the doctor or nurse questions about your health. If needed, they can refer you to other doctors or specialists, too.  Adults older than 65 years often need other care, too. As you get older, your doctor will talk to you about:   How to prevent falling at " home   Hearing or vision tests   Memory testing   How to take your medicines safely   Making sure that you have the help and support you need at home  All topics are updated as new evidence becomes available and our peer review process is complete.  This topic retrieved from [employer] on: May 02, 2024.  Topic 508146 Version 1.0  Release: 32.4.3 - C32.122  © 2024 Up[employer] and/or its affiliates. All rights reserved.  Consumer Information Use and Disclaimer   Disclaimer: This generalized information is a limited summary of diagnosis, treatment, and/or medication information. It is not meant to be comprehensive and should be used as a tool to help the user understand and/or assess potential diagnostic and treatment options. It does NOT include all information about conditions, treatments, medications, side effects, or risks that may apply to a specific patient. It is not intended to be medical advice or a substitute for the medical advice, diagnosis, or treatment of a health care provider based on the health care provider's examination and assessment of a patient's specific and unique circumstances. Patients must speak with a health care provider for complete information about their health, medical questions, and treatment options, including any risks or benefits regarding use of medications. This information does not endorse any treatments or medications as safe, effective, or approved for treating a specific patient. Up[employer], Inc[employer] and its affiliates disclaim any warranty or liability relating to this information or the use thereof.The use of this information is governed by the Terms of Use, available at https://www.woltersN2Careuwer.com/en/know/clinical-effectiveness-terms. 2024© [employer], Inc. and its affiliates and/or licensors. All rights reserved.  Copyright   © 2024 [employer], [employer] and/or its affiliates. All rights reserved.

## (undated) DEVICE — GLOVE INDICATOR PI UNDERGLOVE SZ 7.5 BLUE

## (undated) DEVICE — FABRIC REINFORCED, SURGICAL GOWN, XL: Brand: CONVERTORS

## (undated) DEVICE — ACE WRAP 4 IN UNSTERILE

## (undated) DEVICE — BASIC DOUBLE BASIN 2-LF: Brand: MEDLINE INDUSTRIES, INC.

## (undated) DEVICE — GLOVE SRG BIOGEL 6.5

## (undated) DEVICE — SUT PROLENE 4-0 PS-2 18 IN 8682G

## (undated) DEVICE — CAST PLASTER 6 IN ROLL

## (undated) DEVICE — SPONGE GAUZE 4 X 8 12 PLY STRL LF

## (undated) DEVICE — INTENDED FOR TISSUE SEPARATION, AND OTHER PROCEDURES THAT REQUIRE A SHARP SURGICAL BLADE TO PUNCTURE OR CUT.: Brand: BARD-PARKER ® CARBON RIB-BACK BLADES

## (undated) DEVICE — PACK GENERAL LF

## (undated) DEVICE — GROUNDING PAD UNIVERSAL SLW

## (undated) DEVICE — TIBURON EXTREMITY SHEET: Brand: CONVERTORS

## (undated) DEVICE — NEEDLE 25G X 1 1/2

## (undated) DEVICE — SYRINGE 10ML LL CONTROL TOP

## (undated) DEVICE — DRAPE C-ARM X-RAY

## (undated) DEVICE — TOURNIQUET ORANGE TEXTURED 1 X 18 IN LF

## (undated) DEVICE — GLOVE INDICATOR PI UNDERGLOVE SZ 6.5 BLUE

## (undated) DEVICE — OCCLUSIVE GAUZE STRIP,3% BISMUTH TRIBROMOPHENATE IN PETROLATUM BLEND: Brand: XEROFORM

## (undated) DEVICE — BANDAGE, ESMARK LF STR 6"X9' (20/CS): Brand: CYPRESS

## (undated) DEVICE — 3M™ COBAN™ NL STERILE NON-LATEX SELF-ADHERENT WRAP, 2084S, 4 IN X 5 YD (10 CM X 4,5 M), 18 ROLLS/CASE: Brand: 3M™ COBAN™

## (undated) DEVICE — PAD CAST 4 IN COTTON NON STERILE

## (undated) DEVICE — CHLORAPREP HI-LITE 26ML ORANGE

## (undated) DEVICE — DRAPE SHEET THREE QUARTER

## (undated) DEVICE — GLOVE SRG BIOGEL 7.5

## (undated) DEVICE — 1.25MM KIRSCHNER WIRE W/TROCAR POINT 150MM
Type: IMPLANTABLE DEVICE | Status: NON-FUNCTIONAL
Removed: 2017-03-23

## (undated) DEVICE — PROXIMATE PLUS MD MULTI-DIRECTIONAL RELEASE SKIN STAPLERS CONTAINS 35 STAINLESS STEEL STAPLES APPROXIMATE CLOSED DIMENSIONS: 6.9MM X 3.9MM WIDE: Brand: PROXIMATE

## (undated) DEVICE — CURITY STRETCH BANDAGE: Brand: CURITY

## (undated) DEVICE — BANDAGE ACE VELCRO 4 IN LF

## (undated) DEVICE — ANTIBACTERIAL UNDYED BRAIDED (POLYGLACTIN 910), SYNTHETIC ABSORBABLE SUTURE: Brand: COATED VICRYL

## (undated) DEVICE — BANDAGE ACE VELCRO 6 IN LF